# Patient Record
Sex: FEMALE | Race: BLACK OR AFRICAN AMERICAN | NOT HISPANIC OR LATINO | URBAN - METROPOLITAN AREA
[De-identification: names, ages, dates, MRNs, and addresses within clinical notes are randomized per-mention and may not be internally consistent; named-entity substitution may affect disease eponyms.]

---

## 2024-06-07 ENCOUNTER — HOSPITAL ENCOUNTER (OUTPATIENT)
Facility: HOSPITAL | Age: 29
Discharge: HOME | End: 2024-06-07
Attending: OBSTETRICS & GYNECOLOGY | Admitting: OBSTETRICS & GYNECOLOGY
Payer: MEDICAID

## 2024-06-07 VITALS
TEMPERATURE: 98.2 F | OXYGEN SATURATION: 98 % | DIASTOLIC BLOOD PRESSURE: 72 MMHG | BODY MASS INDEX: 26.13 KG/M2 | HEART RATE: 99 BPM | SYSTOLIC BLOOD PRESSURE: 127 MMHG | RESPIRATION RATE: 18 BRPM | HEIGHT: 70 IN | WEIGHT: 182.54 LBS

## 2024-06-07 PROBLEM — O99.342 ANXIETY DURING PREGNANCY IN SECOND TRIMESTER, ANTEPARTUM (HHS-HCC): Status: ACTIVE | Noted: 2024-04-04

## 2024-06-07 PROBLEM — O99.013 ANEMIA DURING PREGNANCY IN THIRD TRIMESTER (HHS-HCC): Status: ACTIVE | Noted: 2022-06-14

## 2024-06-07 PROBLEM — O30.033 MONOCHORIONIC DIAMNIOTIC TWIN GESTATION IN THIRD TRIMESTER (HHS-HCC): Status: ACTIVE | Noted: 2024-04-04

## 2024-06-07 PROBLEM — F41.9 ANXIETY DURING PREGNANCY IN SECOND TRIMESTER, ANTEPARTUM (HHS-HCC): Status: ACTIVE | Noted: 2024-04-04

## 2024-06-07 PROBLEM — O99.342 DEPRESSION AFFECTING PREGNANCY IN SECOND TRIMESTER, ANTEPARTUM (HHS-HCC): Status: ACTIVE | Noted: 2024-04-04

## 2024-06-07 PROBLEM — E88.819 INSULIN RESISTANCE: Status: ACTIVE | Noted: 2024-05-24

## 2024-06-07 PROBLEM — F32.A DEPRESSION AFFECTING PREGNANCY IN SECOND TRIMESTER, ANTEPARTUM (HHS-HCC): Status: ACTIVE | Noted: 2024-04-04

## 2024-06-07 PROBLEM — O40.3XX2: Status: ACTIVE | Noted: 2024-04-05

## 2024-06-07 PROBLEM — O09.32: Status: ACTIVE | Noted: 2024-04-04

## 2024-06-07 PROBLEM — O09.292: Status: ACTIVE | Noted: 2024-04-04

## 2024-06-07 LAB
BILIRUBIN, POC: NEGATIVE
BLOOD URINE, POC: NEGATIVE
CLARITY, POC: CLEAR
COLOR, POC: YELLOW
GLUCOSE URINE, POC: NEGATIVE
KETONES, POC: POSITIVE
LEUKOCYTE EST, POC: NEGATIVE
NITRITE, POC: NEGATIVE
PH, POC: 6
POC APPEARANCE OF BODY FLUID: CLEAR
SPECIFIC GRAVITY, POC: 1.01
URINE PROTEIN, POC: NEGATIVE
UROBILINOGEN, POC: 0.2

## 2024-06-07 PROCEDURE — 2500000004 HC RX 250 GENERAL PHARMACY W/ HCPCS (ALT 636 FOR OP/ED)

## 2024-06-07 PROCEDURE — 99213 OFFICE O/P EST LOW 20 MIN: CPT

## 2024-06-07 PROCEDURE — 96372 THER/PROPH/DIAG INJ SC/IM: CPT

## 2024-06-07 PROCEDURE — 99214 OFFICE O/P EST MOD 30 MIN: CPT | Mod: 25

## 2024-06-07 RX ORDER — FERROUS SULFATE 325(65) MG
325 TABLET ORAL 2 TIMES DAILY
COMMUNITY
Start: 2024-05-14 | End: 2024-06-13

## 2024-06-07 RX ORDER — LIDOCAINE HYDROCHLORIDE 10 MG/ML
0.5 INJECTION INFILTRATION; PERINEURAL ONCE AS NEEDED
Status: DISCONTINUED | OUTPATIENT
Start: 2024-06-07 | End: 2024-06-07 | Stop reason: HOSPADM

## 2024-06-07 RX ORDER — PRENATAL WITH FERROUS FUM AND FOLIC ACID 3080; 920; 120; 400; 22; 1.84; 3; 20; 10; 1; 12; 200; 27; 25; 2 [IU]/1; [IU]/1; MG/1; [IU]/1; MG/1; MG/1; MG/1; MG/1; MG/1; MG/1; UG/1; MG/1; MG/1; MG/1; MG/1
1 TABLET ORAL DAILY
COMMUNITY
Start: 2021-01-27 | End: 2024-06-11 | Stop reason: HOSPADM

## 2024-06-07 RX ORDER — BETAMETHASONE SODIUM PHOSPHATE AND BETAMETHASONE ACETATE 3; 3 MG/ML; MG/ML
12 INJECTION, SUSPENSION INTRA-ARTICULAR; INTRALESIONAL; INTRAMUSCULAR; SOFT TISSUE ONCE
Status: COMPLETED | OUTPATIENT
Start: 2024-06-07 | End: 2024-06-07

## 2024-06-07 RX ADMIN — BETAMETHASONE ACETATE AND BETAMETHASONE SODIUM PHOSPHATE 12 MG: 3; 3 INJECTION, SUSPENSION INTRA-ARTICULAR; INTRALESIONAL; INTRAMUSCULAR; SOFT TISSUE at 15:36

## 2024-06-07 SDOH — SOCIAL STABILITY: SOCIAL INSECURITY: ABUSE SCREEN: ADULT

## 2024-06-07 SDOH — HEALTH STABILITY: MENTAL HEALTH: HAVE YOU USED ANY PRESCRIPTION DRUGS OTHER THAN PRESCRIBED IN THE PAST 12 MONTHS?: NO

## 2024-06-07 SDOH — SOCIAL STABILITY: SOCIAL INSECURITY: PHYSICAL ABUSE: DENIES

## 2024-06-07 SDOH — SOCIAL STABILITY: SOCIAL INSECURITY: HAVE YOU HAD ANY THOUGHTS OF HARMING ANYONE ELSE?: NO

## 2024-06-07 SDOH — ECONOMIC STABILITY: HOUSING INSECURITY: DO YOU FEEL UNSAFE GOING BACK TO THE PLACE WHERE YOU ARE LIVING?: NO

## 2024-06-07 SDOH — SOCIAL STABILITY: SOCIAL INSECURITY: ARE THERE ANY APPARENT SIGNS OF INJURIES/BEHAVIORS THAT COULD BE RELATED TO ABUSE/NEGLECT?: NO

## 2024-06-07 SDOH — HEALTH STABILITY: MENTAL HEALTH: NON-SPECIFIC ACTIVE SUICIDAL THOUGHTS (PAST 1 MONTH): NO

## 2024-06-07 SDOH — HEALTH STABILITY: MENTAL HEALTH: STRENGTHS (MUST CHOOSE TWO): SUPPORT FROM FAMILY;SUPPORT FROM FRIENDS

## 2024-06-07 SDOH — SOCIAL STABILITY: SOCIAL INSECURITY: DO YOU FEEL ANYONE HAS EXPLOITED OR TAKEN ADVANTAGE OF YOU FINANCIALLY OR OF YOUR PERSONAL PROPERTY?: NO

## 2024-06-07 SDOH — SOCIAL STABILITY: SOCIAL INSECURITY: HAS ANYONE EVER THREATENED TO HURT YOUR FAMILY OR YOUR PETS?: NO

## 2024-06-07 SDOH — HEALTH STABILITY: MENTAL HEALTH: HAVE YOU USED ANY SUBSTANCES (CANABIS, COCAINE, HEROIN, HALLUCINOGENS, INHALANTS, ETC.) IN THE PAST 12 MONTHS?: NO

## 2024-06-07 SDOH — SOCIAL STABILITY: SOCIAL INSECURITY: VERBAL ABUSE: DENIES

## 2024-06-07 SDOH — HEALTH STABILITY: MENTAL HEALTH: SUICIDAL BEHAVIOR (LIFETIME): NO

## 2024-06-07 SDOH — SOCIAL STABILITY: SOCIAL INSECURITY: ARE YOU OR HAVE YOU BEEN THREATENED OR ABUSED PHYSICALLY, EMOTIONALLY, OR SEXUALLY BY ANYONE?: NO

## 2024-06-07 SDOH — SOCIAL STABILITY: SOCIAL INSECURITY: DOES ANYONE TRY TO KEEP YOU FROM HAVING/CONTACTING OTHER FRIENDS OR DOING THINGS OUTSIDE YOUR HOME?: NO

## 2024-06-07 SDOH — HEALTH STABILITY: MENTAL HEALTH: WERE YOU ABLE TO COMPLETE ALL THE BEHAVIORAL HEALTH SCREENINGS?: YES

## 2024-06-07 SDOH — SOCIAL STABILITY: SOCIAL INSECURITY: HAVE YOU HAD THOUGHTS OF HARMING ANYONE ELSE?: NO

## 2024-06-07 SDOH — HEALTH STABILITY: MENTAL HEALTH: WISH TO BE DEAD (PAST 1 MONTH): NO

## 2024-06-07 ASSESSMENT — LIFESTYLE VARIABLES
SKIP TO QUESTIONS 9-10: 1
AUDIT-C TOTAL SCORE: 0
HOW OFTEN DO YOU HAVE 6 OR MORE DRINKS ON ONE OCCASION: NEVER
AUDIT-C TOTAL SCORE: 0
HOW OFTEN DO YOU HAVE A DRINK CONTAINING ALCOHOL: NEVER
HOW MANY STANDARD DRINKS CONTAINING ALCOHOL DO YOU HAVE ON A TYPICAL DAY: PATIENT DOES NOT DRINK

## 2024-06-07 ASSESSMENT — PATIENT HEALTH QUESTIONNAIRE - PHQ9
2. FEELING DOWN, DEPRESSED OR HOPELESS: NOT AT ALL
SUM OF ALL RESPONSES TO PHQ9 QUESTIONS 1 & 2: 0
1. LITTLE INTEREST OR PLEASURE IN DOING THINGS: NOT AT ALL

## 2024-06-07 ASSESSMENT — PAIN SCALES - GENERAL: PAINLEVEL_OUTOF10: 0 - NO PAIN

## 2024-06-07 NOTE — H&P
Obstetrical Triage History and Physical     Jeni Davis is a 28 y.o.  at 34.6 wga with mo-di twins by LMP c/w 26.5 week US presenting to OB triage for IOL.    Chief Complaint: Requesting IOL    Assessment/Plan      Polyhydramnios Baby A  - Dx today with BSUS by Dr. Chavarria  - Counseled on BMZ, patient accepts  - First dose BMZ  153  - Patient to present to L&D  for BMZ #2 and IOL @ 0000     IUP at 34.6 mo-di twins  - NST reactive x2  - BPP 8/8 x2  - See Dr. Chavarria's note for ultrasonography and counseling  - Good fetal movement x2  - for GBS collection     Maternal Well-being  - All questions and concerns addressed   - Partner deploys Monday    Dispo  - patient appropriate for d/c home, agrees with plan  - f/u at next scheduled OB appt or to triage sooner as needed    Discussed plan and reviewed tracing with Dr. Deon Riggs, APRN-CNP      Active Problems:    Anemia during pregnancy in third trimester ([facility])    Anxiety during pregnancy in second trimester, antepartum ([facility])    Depression affecting pregnancy in second trimester, antepartum ([facility])    Monochorionic diamniotic twin gestation in third trimester ([facility])    Polyhydramnios in third trimester, fetus 2 ([facility])      Pregnancy Problems (from 24 to present)       No problems associated with this episode.          Subjective   Jeni is here wanting IOL. Good fetal movement x2. Denies vaginal bleeding., C/O of occasional contractions., Denies leaking of fluid.      Patient was scheduled at [place] [facility], was sent to [facility] due to census at [facility]. [facility] deferred IOL today given no steroid course started.      Obstetrical History   OB History    Para Term  AB Living   3 1 1   1 1   SAB IAB Ectopic Multiple Live Births     1     1      # Outcome Date GA Lbr Ben/2nd Weight Sex Delivery Anes PTL Lv   3 Current            2 Term 22    M Vag-Spont   DEE DEE   1 IAB                Past  Medical History  Past Medical History:   Diagnosis Date    Other specified health status     No known health problems        Past Surgical History   Past Surgical History:   Procedure Laterality Date    OTHER SURGICAL HISTORY  08/08/2016    Prior Surgical Procedure Not Done       Social History  Social History     Tobacco Use    Smoking status: Not on file    Smokeless tobacco: Not on file   Substance Use Topics    Alcohol use: Not on file     Substance and Sexual Activity   Drug Use Not on file       Allergies  Patient has no known allergies.     Medications  No medications prior to admission.       Objective    Last Vitals  Temp Pulse Resp BP MAP O2 Sat   36.8 °C (98.2 °F) 99 18 127/72   98 %     Physical Examination  FHR is 140 , with accelerations , and a category I  tracing x2  [place] reading:  irregular ctx, > 10 min apart  The fetus A and B are in a vertex presentation, determined by ultrasound  General: Examination reveals a well developed, well nourished, female, in no acute distress. She is alert and cooperative.  Lungs: symmetrical, non-labored breathing.  Cardiac: warm, well-perfused.  Abdomen: soft, non-tender, gravid.  Extremities: no redness or tenderness in the calves or thighs.  Neurological: alert, oriented, normal speech, no focal findings or movement disorder noted.     Non-Stress Test   Baseline Fetal Heart Rate for Non-Stress Test: 140 BPM  Variability in Waveform for Non-Stress Test: Moderate  Accelerations in Non-Stress Test: Yes, greater than/equal to 15 bpm, lasting at least 15 seconds  Decelerations in Non-Stress Test: None  Contractions in Non-Stress Test: Irregular  NST Contraction Frequency: > 10 min  Acoustic Stimulator for Non-Stress Test: No  Interpretation of Non-Stress Test   Interpretation of Non-Stress Test: Reactive  NST for Multiple Fetuses: Yes  Non-Stress Test B  Baseline Fetal Heart Rate for Non-Stress Test B: 140 BPM  Variability in Waveform for Non-Stress Test B:  Moderate  Accelerations in Non-Stress Test B: Yes, greater than/equal to 15 bpm, lasting at least 15 seconds  Decelerations in Non-Stress Test B: None  Contractions in Non-Stress Test B: Irregular  NST B Contraction Frequency: > 10 min  Acoustic Stimulator for Non-Stress Test B: No  Interpretation of Non-Stress Test B  Interpretation of Non-Stress Test B: Reactive                       Lab Review  Admission on 06/07/2024, Discharged on 06/07/2024   Component Date Value Ref Range Status    Color, UA 06/07/2024 Yellow   In process    Clarity, UA 06/07/2024 Clear   In process    Glucose, UA 06/07/2024 NEGATIVE   In process    Bilirubin, UA 06/07/2024 NEGATIVE   In process    Ketones, UA 06/07/2024 Positive   In process    Spec Grav, UA 06/07/2024 1.015   In process    Blood, UA 06/07/2024 Negative   In process    pH, UA 06/07/2024 6.0   In process    Protein, UA 06/07/2024 NEGATIVE   In process    Urobilinogen, UA 06/07/2024 0.2   In process    Leukocytes, UA 06/07/2024 NEGATIVE   In process    Nitrite, UA 06/07/2024 NEGATIVE   In process    Appearance, Fluid 06/07/2024 Clear   In process

## 2024-06-07 NOTE — SIGNIFICANT EVENT
Briefly, patient is 34w6d with MCDA twins complicated by polyhydramnios for one baby and a slight growth discrepency with EFWs 1689g and 2063g respectively. Twins are vertex/vertex.     She was scheduled originally for IOL at [facility] at 35w6d but this was moved up a week due to the polyhydramnios and her partners impending deployment on Monday. Steroids were NOT given for FLM.     She presented today to [facility] for induction and was turned away due to acuity on the floor and the lack of steroids. She presents here requesting IOL.     NST reactive x 2.     Bedside ultrasound by me: vertex/vertex, MVP's 4.1 and 9.8cm. BPP 8/8 x 2, normal UA and MCDA dopplers x 2.     Discussed with patient that IOL for MCDA twins is recommended by ACOG and [facility] at [id number] with my personal practice being delivery of all MCDA twins by 36 weeks.     Reviewed she has no overt medical indication for induction of labor today and that I have significant hesitation delivering  female fetuses at this gestational age without steroid administration.     Patient agrees to steroids today and tomorrow with IOL to start with 2nd dose of steroids given spouses impending deployment. She is aware of the risks of prematurity and high likelihood of a NICU stay for both twins. She declines a NICU consultation. She will return tomorrow for IOL.     Urszula Chavarria MD  Maternal Fetal Medicine  Director of Fetal Intervention

## 2024-06-08 ENCOUNTER — ANESTHESIA EVENT (OUTPATIENT)
Dept: OBSTETRICS AND GYNECOLOGY | Facility: HOSPITAL | Age: 29
End: 2024-06-08
Payer: MEDICAID

## 2024-06-08 ENCOUNTER — ANESTHESIA (OUTPATIENT)
Dept: OBSTETRICS AND GYNECOLOGY | Facility: HOSPITAL | Age: 29
End: 2024-06-08
Payer: MEDICAID

## 2024-06-08 ENCOUNTER — HOSPITAL ENCOUNTER (INPATIENT)
Facility: HOSPITAL | Age: 29
LOS: 3 days | Discharge: HOME | End: 2024-06-11
Attending: OBSTETRICS & GYNECOLOGY | Admitting: STUDENT IN AN ORGANIZED HEALTH CARE EDUCATION/TRAINING PROGRAM
Payer: MEDICAID

## 2024-06-08 PROBLEM — Z34.90 ENCOUNTER FOR ELECTIVE INDUCTION OF LABOR (HHS-HCC): Status: ACTIVE | Noted: 2024-06-08

## 2024-06-08 PROBLEM — K21.9 GASTROESOPHAGEAL REFLUX DISEASE: Status: ACTIVE | Noted: 2024-06-08

## 2024-06-08 PROBLEM — F41.9 ANXIETY: Status: ACTIVE | Noted: 2024-06-08

## 2024-06-08 PROBLEM — F43.10 PTSD (POST-TRAUMATIC STRESS DISORDER): Status: ACTIVE | Noted: 2024-06-08

## 2024-06-08 LAB
ABO GROUP (TYPE) IN BLOOD: NORMAL
ABO GROUP (TYPE) IN BLOOD: NORMAL
ANTIBODY SCREEN: NORMAL
ERYTHROCYTE [DISTWIDTH] IN BLOOD BY AUTOMATED COUNT: 19.5 % (ref 11.5–14.5)
GLUCOSE BLD MANUAL STRIP-MCNC: 118 MG/DL (ref 74–99)
GLUCOSE BLD MANUAL STRIP-MCNC: 125 MG/DL (ref 74–99)
HCT VFR BLD AUTO: 31.6 % (ref 36–46)
HGB BLD-MCNC: 9.5 G/DL (ref 12–16)
MCH RBC QN AUTO: 23.6 PG (ref 26–34)
MCHC RBC AUTO-ENTMCNC: 30.1 G/DL (ref 32–36)
MCV RBC AUTO: 78 FL (ref 80–100)
NRBC BLD-RTO: 0.6 /100 WBCS (ref 0–0)
PLATELET # BLD AUTO: 204 X10*3/UL (ref 150–450)
RBC # BLD AUTO: 4.03 X10*6/UL (ref 4–5.2)
RH FACTOR (ANTIGEN D): NORMAL
RH FACTOR (ANTIGEN D): NORMAL
WBC # BLD AUTO: 7.8 X10*3/UL (ref 4.4–11.3)

## 2024-06-08 PROCEDURE — 4A1H7CZ MONITORING OF PRODUCTS OF CONCEPTION, CARDIAC RATE, VIA NATURAL OR ARTIFICIAL OPENING: ICD-10-PCS | Performed by: STUDENT IN AN ORGANIZED HEALTH CARE EDUCATION/TRAINING PROGRAM

## 2024-06-08 PROCEDURE — 2500000004 HC RX 250 GENERAL PHARMACY W/ HCPCS (ALT 636 FOR OP/ED): Performed by: STUDENT IN AN ORGANIZED HEALTH CARE EDUCATION/TRAINING PROGRAM

## 2024-06-08 PROCEDURE — 1120000001 HC OB PRIVATE ROOM DAILY

## 2024-06-08 PROCEDURE — 59050 FETAL MONITOR W/REPORT: CPT

## 2024-06-08 PROCEDURE — 10907ZC DRAINAGE OF AMNIOTIC FLUID, THERAPEUTIC FROM PRODUCTS OF CONCEPTION, VIA NATURAL OR ARTIFICIAL OPENING: ICD-10-PCS | Performed by: STUDENT IN AN ORGANIZED HEALTH CARE EDUCATION/TRAINING PROGRAM

## 2024-06-08 PROCEDURE — 36415 COLL VENOUS BLD VENIPUNCTURE: CPT | Performed by: STUDENT IN AN ORGANIZED HEALTH CARE EDUCATION/TRAINING PROGRAM

## 2024-06-08 PROCEDURE — 2500000004 HC RX 250 GENERAL PHARMACY W/ HCPCS (ALT 636 FOR OP/ED)

## 2024-06-08 PROCEDURE — 7210000002 HC LABOR PER HOUR

## 2024-06-08 PROCEDURE — 2500000005 HC RX 250 GENERAL PHARMACY W/O HCPCS

## 2024-06-08 PROCEDURE — 3700000001 HC GENERAL ANESTHESIA TIME - INITIAL BASE CHARGE: Performed by: STUDENT IN AN ORGANIZED HEALTH CARE EDUCATION/TRAINING PROGRAM

## 2024-06-08 PROCEDURE — 85027 COMPLETE CBC AUTOMATED: CPT | Performed by: STUDENT IN AN ORGANIZED HEALTH CARE EDUCATION/TRAINING PROGRAM

## 2024-06-08 PROCEDURE — 2500000005 HC RX 250 GENERAL PHARMACY W/O HCPCS: Performed by: STUDENT IN AN ORGANIZED HEALTH CARE EDUCATION/TRAINING PROGRAM

## 2024-06-08 PROCEDURE — 86923 COMPATIBILITY TEST ELECTRIC: CPT

## 2024-06-08 PROCEDURE — 82947 ASSAY GLUCOSE BLOOD QUANT: CPT

## 2024-06-08 PROCEDURE — 86901 BLOOD TYPING SEROLOGIC RH(D): CPT | Performed by: STUDENT IN AN ORGANIZED HEALTH CARE EDUCATION/TRAINING PROGRAM

## 2024-06-08 PROCEDURE — 3700000014 EPIDURAL BLOCK: Mod: GC

## 2024-06-08 PROCEDURE — 3700000002 HC GENERAL ANESTHESIA TIME - EACH INCREMENTAL 1 MINUTE: Performed by: STUDENT IN AN ORGANIZED HEALTH CARE EDUCATION/TRAINING PROGRAM

## 2024-06-08 PROCEDURE — 86780 TREPONEMA PALLIDUM: CPT | Performed by: STUDENT IN AN ORGANIZED HEALTH CARE EDUCATION/TRAINING PROGRAM

## 2024-06-08 RX ORDER — INSULIN LISPRO 100 [IU]/ML
0-5 INJECTION, SOLUTION INTRAVENOUS; SUBCUTANEOUS
Status: DISCONTINUED | OUTPATIENT
Start: 2024-06-08 | End: 2024-06-09

## 2024-06-08 RX ORDER — FENTANYL CITRATE 50 UG/ML
50 INJECTION, SOLUTION INTRAMUSCULAR; INTRAVENOUS ONCE
Status: COMPLETED | OUTPATIENT
Start: 2024-06-08 | End: 2024-06-08

## 2024-06-08 RX ORDER — CARBOPROST TROMETHAMINE 250 UG/ML
250 INJECTION, SOLUTION INTRAMUSCULAR ONCE AS NEEDED
Status: DISCONTINUED | OUTPATIENT
Start: 2024-06-08 | End: 2024-06-09

## 2024-06-08 RX ORDER — METOCLOPRAMIDE 10 MG/1
10 TABLET ORAL EVERY 6 HOURS PRN
Status: DISCONTINUED | OUTPATIENT
Start: 2024-06-08 | End: 2024-06-09

## 2024-06-08 RX ORDER — TRANEXAMIC ACID 100 MG/ML
1000 INJECTION, SOLUTION INTRAVENOUS ONCE AS NEEDED
Status: DISCONTINUED | OUTPATIENT
Start: 2024-06-08 | End: 2024-06-09

## 2024-06-08 RX ORDER — LOPERAMIDE HYDROCHLORIDE 2 MG/1
4 CAPSULE ORAL EVERY 2 HOUR PRN
Status: DISCONTINUED | OUTPATIENT
Start: 2024-06-08 | End: 2024-06-09

## 2024-06-08 RX ORDER — OXYTOCIN 10 [USP'U]/ML
10 INJECTION, SOLUTION INTRAMUSCULAR; INTRAVENOUS ONCE AS NEEDED
Status: DISCONTINUED | OUTPATIENT
Start: 2024-06-08 | End: 2024-06-09

## 2024-06-08 RX ORDER — METHYLERGONOVINE MALEATE 0.2 MG/ML
0.2 INJECTION INTRAVENOUS ONCE AS NEEDED
Status: DISCONTINUED | OUTPATIENT
Start: 2024-06-08 | End: 2024-06-09

## 2024-06-08 RX ORDER — ONDANSETRON 4 MG/1
4 TABLET, FILM COATED ORAL EVERY 6 HOURS PRN
Status: DISCONTINUED | OUTPATIENT
Start: 2024-06-08 | End: 2024-06-09

## 2024-06-08 RX ORDER — BETAMETHASONE SODIUM PHOSPHATE AND BETAMETHASONE ACETATE 3; 3 MG/ML; MG/ML
12 INJECTION, SUSPENSION INTRA-ARTICULAR; INTRALESIONAL; INTRAMUSCULAR; SOFT TISSUE ONCE
Status: COMPLETED | OUTPATIENT
Start: 2024-06-08 | End: 2024-06-08

## 2024-06-08 RX ORDER — NIFEDIPINE 10 MG/1
10 CAPSULE ORAL ONCE AS NEEDED
Status: DISCONTINUED | OUTPATIENT
Start: 2024-06-08 | End: 2024-06-09

## 2024-06-08 RX ORDER — DEXTROSE 50 % IN WATER (D50W) INTRAVENOUS SYRINGE
25
Status: DISCONTINUED | OUTPATIENT
Start: 2024-06-08 | End: 2024-06-09

## 2024-06-08 RX ORDER — LIDOCAINE HYDROCHLORIDE 10 MG/ML
30 INJECTION INFILTRATION; PERINEURAL ONCE AS NEEDED
Status: DISCONTINUED | OUTPATIENT
Start: 2024-06-08 | End: 2024-06-09

## 2024-06-08 RX ORDER — ONDANSETRON HYDROCHLORIDE 2 MG/ML
4 INJECTION, SOLUTION INTRAVENOUS EVERY 6 HOURS PRN
Status: DISCONTINUED | OUTPATIENT
Start: 2024-06-08 | End: 2024-06-09

## 2024-06-08 RX ORDER — SODIUM CHLORIDE, SODIUM LACTATE, POTASSIUM CHLORIDE, CALCIUM CHLORIDE 600; 310; 30; 20 MG/100ML; MG/100ML; MG/100ML; MG/100ML
125 INJECTION, SOLUTION INTRAVENOUS CONTINUOUS
Status: DISCONTINUED | OUTPATIENT
Start: 2024-06-08 | End: 2024-06-09

## 2024-06-08 RX ORDER — HYDRALAZINE HYDROCHLORIDE 20 MG/ML
5 INJECTION INTRAMUSCULAR; INTRAVENOUS ONCE AS NEEDED
Status: DISCONTINUED | OUTPATIENT
Start: 2024-06-08 | End: 2024-06-09

## 2024-06-08 RX ORDER — METOCLOPRAMIDE HYDROCHLORIDE 5 MG/ML
10 INJECTION INTRAMUSCULAR; INTRAVENOUS EVERY 6 HOURS PRN
Status: DISCONTINUED | OUTPATIENT
Start: 2024-06-08 | End: 2024-06-09

## 2024-06-08 RX ORDER — OXYTOCIN/0.9 % SODIUM CHLORIDE 30/500 ML
2-30 PLASTIC BAG, INJECTION (ML) INTRAVENOUS CONTINUOUS
Status: DISCONTINUED | OUTPATIENT
Start: 2024-06-08 | End: 2024-06-09

## 2024-06-08 RX ORDER — DEXTROSE 50 % IN WATER (D50W) INTRAVENOUS SYRINGE
12.5
Status: DISCONTINUED | OUTPATIENT
Start: 2024-06-08 | End: 2024-06-09

## 2024-06-08 RX ORDER — ACETAMINOPHEN 325 MG/1
975 TABLET ORAL EVERY 6 HOURS PRN
Status: DISCONTINUED | OUTPATIENT
Start: 2024-06-08 | End: 2024-06-09

## 2024-06-08 RX ORDER — OXYTOCIN/0.9 % SODIUM CHLORIDE 30/500 ML
60 PLASTIC BAG, INJECTION (ML) INTRAVENOUS ONCE AS NEEDED
Status: DISCONTINUED | OUTPATIENT
Start: 2024-06-08 | End: 2024-06-09

## 2024-06-08 RX ORDER — FENTANYL/BUPIVACAINE/NS/PF 2MCG/ML-.1
PLASTIC BAG, INJECTION (ML) INJECTION AS NEEDED
Status: DISCONTINUED | OUTPATIENT
Start: 2024-06-08 | End: 2024-06-09

## 2024-06-08 RX ORDER — GUAIFENESIN 600 MG/1
600 TABLET, EXTENDED RELEASE ORAL 2 TIMES DAILY PRN
Status: DISCONTINUED | OUTPATIENT
Start: 2024-06-08 | End: 2024-06-09

## 2024-06-08 RX ORDER — TERBUTALINE SULFATE 1 MG/ML
0.25 INJECTION SUBCUTANEOUS ONCE AS NEEDED
Status: DISCONTINUED | OUTPATIENT
Start: 2024-06-08 | End: 2024-06-09

## 2024-06-08 RX ORDER — LABETALOL HYDROCHLORIDE 5 MG/ML
20 INJECTION, SOLUTION INTRAVENOUS ONCE AS NEEDED
Status: DISCONTINUED | OUTPATIENT
Start: 2024-06-08 | End: 2024-06-09

## 2024-06-08 RX ORDER — MISOPROSTOL 200 UG/1
800 TABLET ORAL ONCE AS NEEDED
Status: COMPLETED | OUTPATIENT
Start: 2024-06-08 | End: 2024-06-09

## 2024-06-08 RX ORDER — PENICILLIN G 3000000 [IU]/50ML
3 INJECTION, SOLUTION INTRAVENOUS EVERY 4 HOURS
Status: DISCONTINUED | OUTPATIENT
Start: 2024-06-08 | End: 2024-06-09

## 2024-06-08 RX ADMIN — GUAIFENESIN 600 MG: 600 TABLET ORAL at 16:44

## 2024-06-08 RX ADMIN — EPINEPHRINE 14 ML/HR: 1 INJECTION, SOLUTION, CONCENTRATE INTRAVENOUS at 21:33

## 2024-06-08 RX ADMIN — BETAMETHASONE ACETATE AND BETAMETHASONE SODIUM PHOSPHATE 12 MG: 3; 3 INJECTION, SUSPENSION INTRA-ARTICULAR; INTRALESIONAL; INTRAMUSCULAR; SOFT TISSUE at 16:44

## 2024-06-08 RX ADMIN — SODIUM CHLORIDE, POTASSIUM CHLORIDE, SODIUM LACTATE AND CALCIUM CHLORIDE 125 ML/HR: 600; 310; 30; 20 INJECTION, SOLUTION INTRAVENOUS at 15:51

## 2024-06-08 RX ADMIN — FENTANYL CITRATE 5 ML: 50 INJECTION INTRAMUSCULAR; INTRAVENOUS at 21:31

## 2024-06-08 RX ADMIN — ONDANSETRON 4 MG: 2 INJECTION INTRAMUSCULAR; INTRAVENOUS at 16:53

## 2024-06-08 RX ADMIN — Medication: at 19:40

## 2024-06-08 RX ADMIN — PENICILLIN G 3 MILLION UNITS: 3000000 INJECTION, SOLUTION INTRAVENOUS at 20:15

## 2024-06-08 RX ADMIN — SODIUM CHLORIDE, POTASSIUM CHLORIDE, SODIUM LACTATE AND CALCIUM CHLORIDE 500 ML: 600; 310; 30; 20 INJECTION, SOLUTION INTRAVENOUS at 21:00

## 2024-06-08 RX ADMIN — PENICILLIN G POTASSIUM 5 MILLION UNITS: 5000000 INJECTION, POWDER, FOR SOLUTION INTRAMUSCULAR; INTRAVENOUS at 16:23

## 2024-06-08 RX ADMIN — FENTANYL CITRATE 50 MCG: 50 INJECTION, SOLUTION INTRAMUSCULAR; INTRAVENOUS at 16:58

## 2024-06-08 RX ADMIN — ACETAMINOPHEN 975 MG: 325 TABLET ORAL at 16:44

## 2024-06-08 RX ADMIN — FENTANYL CITRATE 5 ML: 50 INJECTION INTRAMUSCULAR; INTRAVENOUS at 21:28

## 2024-06-08 RX ADMIN — Medication 2 MILLI-UNITS/MIN: at 22:30

## 2024-06-08 SDOH — HEALTH STABILITY: MENTAL HEALTH: NON-SPECIFIC ACTIVE SUICIDAL THOUGHTS (PAST 1 MONTH): NO

## 2024-06-08 SDOH — SOCIAL STABILITY: SOCIAL INSECURITY: DOES ANYONE TRY TO KEEP YOU FROM HAVING/CONTACTING OTHER FRIENDS OR DOING THINGS OUTSIDE YOUR HOME?: NO

## 2024-06-08 SDOH — HEALTH STABILITY: MENTAL HEALTH: STRENGTHS (MUST CHOOSE TWO): SUPPORT FROM FRIENDS;SUPPORT FROM FAMILY

## 2024-06-08 SDOH — SOCIAL STABILITY: SOCIAL INSECURITY: HAS ANYONE EVER THREATENED TO HURT YOUR FAMILY OR YOUR PETS?: NO

## 2024-06-08 SDOH — SOCIAL STABILITY: SOCIAL INSECURITY: ABUSE SCREEN: ADULT

## 2024-06-08 SDOH — SOCIAL STABILITY: SOCIAL INSECURITY: ARE YOU OR HAVE YOU BEEN THREATENED OR ABUSED PHYSICALLY, EMOTIONALLY, OR SEXUALLY BY ANYONE?: NO

## 2024-06-08 SDOH — SOCIAL STABILITY: SOCIAL INSECURITY: HAVE YOU HAD THOUGHTS OF HARMING ANYONE ELSE?: NO

## 2024-06-08 SDOH — SOCIAL STABILITY: SOCIAL INSECURITY: VERBAL ABUSE: DENIES

## 2024-06-08 SDOH — HEALTH STABILITY: MENTAL HEALTH: WISH TO BE DEAD (PAST 1 MONTH): NO

## 2024-06-08 SDOH — HEALTH STABILITY: MENTAL HEALTH: SUICIDAL BEHAVIOR (LIFETIME): NO

## 2024-06-08 SDOH — SOCIAL STABILITY: SOCIAL INSECURITY: HAVE YOU HAD ANY THOUGHTS OF HARMING ANYONE ELSE?: NO

## 2024-06-08 SDOH — ECONOMIC STABILITY: HOUSING INSECURITY: DO YOU FEEL UNSAFE GOING BACK TO THE PLACE WHERE YOU ARE LIVING?: NO

## 2024-06-08 SDOH — HEALTH STABILITY: MENTAL HEALTH: HAVE YOU USED ANY PRESCRIPTION DRUGS OTHER THAN PRESCRIBED IN THE PAST 12 MONTHS?: NO

## 2024-06-08 SDOH — SOCIAL STABILITY: SOCIAL INSECURITY: ARE THERE ANY APPARENT SIGNS OF INJURIES/BEHAVIORS THAT COULD BE RELATED TO ABUSE/NEGLECT?: NO

## 2024-06-08 SDOH — SOCIAL STABILITY: SOCIAL INSECURITY: PHYSICAL ABUSE: DENIES

## 2024-06-08 SDOH — HEALTH STABILITY: MENTAL HEALTH: HAVE YOU USED ANY SUBSTANCES (CANABIS, COCAINE, HEROIN, HALLUCINOGENS, INHALANTS, ETC.) IN THE PAST 12 MONTHS?: NO

## 2024-06-08 SDOH — SOCIAL STABILITY: SOCIAL INSECURITY: DO YOU FEEL ANYONE HAS EXPLOITED OR TAKEN ADVANTAGE OF YOU FINANCIALLY OR OF YOUR PERSONAL PROPERTY?: NO

## 2024-06-08 SDOH — HEALTH STABILITY: MENTAL HEALTH: WERE YOU ABLE TO COMPLETE ALL THE BEHAVIORAL HEALTH SCREENINGS?: YES

## 2024-06-08 ASSESSMENT — LIFESTYLE VARIABLES
HOW OFTEN DO YOU HAVE A DRINK CONTAINING ALCOHOL: NEVER
HOW OFTEN DO YOU HAVE 6 OR MORE DRINKS ON ONE OCCASION: NEVER
AUDIT-C TOTAL SCORE: 0
SKIP TO QUESTIONS 9-10: 1
HOW MANY STANDARD DRINKS CONTAINING ALCOHOL DO YOU HAVE ON A TYPICAL DAY: PATIENT DOES NOT DRINK
AUDIT-C TOTAL SCORE: 0

## 2024-06-08 ASSESSMENT — PAIN SCALES - GENERAL
PAINLEVEL_OUTOF10: 0 - NO PAIN
PAINLEVEL_OUTOF10: 5 - MODERATE PAIN
PAINLEVEL_OUTOF10: 0 - NO PAIN

## 2024-06-08 ASSESSMENT — ACTIVITIES OF DAILY LIVING (ADL): LACK_OF_TRANSPORTATION: NO

## 2024-06-08 NOTE — PROGRESS NOTES
Intrapartum Progress Note    Assessment/Plan   Elizabeth Davis is a 28 y.o.  at 35w0d. IRAIDA: 2024, undergoing IOL for mo/di twins    IOL  - currently with crb + pitocin, plan for AROM when able  - epidural by maternal request  - CEFM  - patient has been counseled by multiple providers regarding breech extraction of twin B, she does NOT desire this mode of delivery and would want a  if twin B were to be malpresenting following delivery of twin A    Mo/di @35.0wga IUP  - BMZ -   - GBS positive, PCN ppx   - Please see MFM note by Dr. Chavarria regarding decision for delivery prior to being fully steroid complete   - No GDM screening, baby B with polyhydramnios, last hgb A1c 5.9%. Continue q4hr glucose checks with SSI   - EFW 34%/63%, 12% discordance     Postpartum  - declines PPBC    Discussed with and seen by Dr Marco Antonio Garcia MD     Principal Problem:    Encounter for elective induction of labor ([facility])  Active Problems:    Anxiety    Gastroesophageal reflux disease      Subjective   Patient feeling cramping now with crb in place.     Objective   Last Vitals:  Temp Pulse Resp BP MAP Pulse Ox   36.4 °C (97.5 °F) 92 18 113/56   98 %     Vitals Min/Max Last 24 Hours:  Temp  Min: 36.4 °C (97.5 °F)  Max: 36.9 °C (98.4 °F)  Pulse  Min: 91  Max: 100  Resp  Min: 16  Max: 18  BP  Min: 113/56  Max: 119/78    Intake/Output:  No intake or output data in the 24 hours ending 24    Physical Examination:  GENERAL: Examination reveals a well developed, well nourished, gravid female in no acute distress. She is alert and cooperative.  LUNGS:  normal effort of breathing ORA  HEART:  warm and well perfused  FHR A  140, moderate variability, with Accelerations, and a Category I tracing.    FHR B 135, moderate variability, with Accelerations, and a Category I tracing.    [place] reading:   irritable  EXTREMITIES: no redness or tenderness in the calves or thighs, no edema  SKIN: normal coloration  "and turgor, no rashes  NEUROLOGICAL: alert, oriented, normal speech, no focal findings or movement disorder noted  PSYCHOLOGICAL: awake and alert; oriented to person, place, and time    Lab Review:  Lab Results   Component Value Date    ABO A 06/08/2024    LABRH POS 06/08/2024    ABSCRN NEG 06/08/2024     Lab Results   Component Value Date    WBC 7.8 06/08/2024    HGB 9.5 (L) 06/08/2024    HCT 31.6 (L) 06/08/2024     06/08/2024     No results found for: \"GRPBSTREP\"  "

## 2024-06-08 NOTE — ANESTHESIA PREPROCEDURE EVALUATION
Patient: Elizabeth Davis    Evaluation Method: In-person visit    Procedure Information    Date: 06/08/24  Procedure: Labor Consult         Relevant Problems   Anesthesia (within normal limits)      Pulmonary (within normal limits)      Neuro   (+) Anxiety   (+) Depression affecting pregnancy in second trimester, antepartum ([facility])      GI   (+) Gastroesophageal reflux disease (GERD with pregnancy)      /Renal (within normal limits)      Liver (within normal limits)      Endocrine (within normal limits)      Hematology   (+) Anemia during pregnancy in third trimester ([facility])      HEENT   (+) Conductive hearing loss      GYN   (+) Monochorionic diamniotic twin gestation in third trimester ([facility])       Clinical information reviewed:   Tobacco  Allergies  Meds   Med Hx  Surg Hx   Fam Hx  Soc Hx        NPO Detail:  No data recorded     OB/GYN     PHYSICAL EXAM    Anesthesia Plan

## 2024-06-08 NOTE — H&P
Obstetrical Admission History and Physical    Assessment/Plan    Elizabeth Davis is a 28 y.o.  at 35w0d, IRA[place]: 2024, by LMP c/w 25 wk US admitted for IOL in the setting of mo/di twins    IOL, mo/di twins  Admitted, consented, scanned, cephalic/cephalic  Will induce with CRB and pitocin, CRB placed   Epidural as requested. Recommended early epidural in setting of twin delivery.  CEFM, currently Cat I x2  Delivery plan: patient desires vaginal delivery, patient counseled on risks of labor, vaginal delivery, and possibility of C/S for varying maternal or fetal indications. Presentation currently cephalic x2. Discussed that there is a risk that baby B flips to breech presentation. Discussed that she would be a candidate for a breech extraction if B flips to breech given that they are concordantly grown and reviewed risk of head entrapment. Discussed that outcomes are similar for fetuses born by breech extraction and CS. Reviewed benefit of breech extraction in avoiding major surgery. At this time, patient expressed desire to avoid breech extraction and prefers primary CS in setting of breech extraction.    Fetal wellbeing  Mo/di twins at 35.0wga  BMZ 6/7-6/8 (second dose given at 1600)  GBS positive, PCN ppx  Please see MFM note by Dr. Chavarria regarding decision for delivery prior to being fully steroid complete  No GDM screening, baby B with polyhydramnios, last hgb A1c 5.9%. Will order q4hr glucose checks with SSI  EFW 34%/63%, 12% discordance    Postpartum planning:  Contraception: declines    Patient seen and discussed with Dr. Nadia Goodwin MD  OBGYN    Subjective   Patient presents for planned IOL in the setting of mo/di twins and for BMZ#2. Feeling some intermittent contractions. Denies LOF, VB. Reports good FM.    Pregnancy notable for:  Medical Problems       Problem List       * (Principal) Encounter for elective induction of labor ([facility])    Anemia during pregnancy in third trimester  ([facility])    Overview Signed 2024  7:35 PM by ROMY Crespo     Formatting of this note might be different from the original.   Hgb 8.3 on     Patient unable to tolerate oral iron.    Plan for IV iron infusions. Blood management referral placed .    Plan to type&cross during delivery admission given twin gestation.      Last Assessment & Plan:    Formatting of this note might be different from the original.   Iron and ferritin orders placed at last visit. Encouraged patient to go to lab.         Anxiety during pregnancy in second trimester, antepartum ([facility])    Conductive hearing loss    Depression affecting pregnancy in second trimester, antepartum ([facility])    History of maternal third degree perineal laceration, currently pregnant in second trimester ([facility])    Insulin resistance    Overview Signed 2024  7:35 PM by ROMY Crespo     Formatting of this note might be different from the original.   Third trimester screening A1c 5.9%   Patient did not  supplies.        Late prenatal care affecting pregnancy, antepartum, second trimester ([facility])    Monochorionic diamniotic twin gestation in third trimester ([facility])    Overview Signed 2024  7:35 PM by ROMY Crespo     Formatting of this note might be different from the original.   Mono-Di Twins confirmed on anatomy US    Twin B - mild polyhydramnios      [ x] TTTS screening q2wks    [ x] Growth [id number]    [ x] weekly BPPs starting 32 weeks    [ x] IOL vs CS pending fetal presentation         Polyhydramnios in third trimester, fetus 2 ([facility])    Overview Signed 2024  7:35 PM by JUDY Crespo-CNP     Formatting of this note might be different from the original.   Polyhyramnios of twin B - MVP 9.5 on     Hgb A1c 5.9% on .    Patient has not done 1hr GTT. Prefers monitoring blood sugars. Supplies sent .                Obstetrical History   OB History    Para  Term  AB Living   3 1 1   1 1   SAB IAB Ectopic Multiple Live Births     1     1      # Outcome Date GA Lbr Ben/2nd Weight Sex Delivery Anes PTL Lv   3 Current            2 Term 22    M Vag-Spont   DEE DEE   1 IAB                Past Medical History  Past Medical History:   Diagnosis Date    Other specified health status     No known health problems        Past Surgical History   Past Surgical History:   Procedure Laterality Date    OTHER SURGICAL HISTORY  2016    Prior Surgical Procedure Not Done       Social History  Social History     Tobacco Use    Smoking status: Never    Smokeless tobacco: Never   Substance Use Topics    Alcohol use: Never     Substance and Sexual Activity   Drug Use Never       Allergies  Patient has no known allergies.     Medications  Medications Prior to Admission   Medication Sig Dispense Refill Last Dose    ferrous sulfate, 325 mg ferrous sulfate, tablet Take 1 tablet by mouth twice a day. Patient stated she took this med today   2024    prenatal vitamin calcium-iron-folic (Prenatal Vitamin Plus Low Iron) 27 mg iron- 1 mg tablet Take 1 tablet by mouth once daily.   2024       Objective    Last Vitals  Temp Pulse Resp BP MAP O2 Sat   36.8 °C (98.2 °F) 91 16 119/78   98 %     Physical Examination  General: no acute distress  HEENT: normocephalic, atraumatic  Heart: warm and well perfused  Lungs: breathing comfortably on room air  Abdomen: gravid  Extremities: moving all extremities  Neuro: awake and conversant  Psych: appropriate mood and affect    OB exam:  SVE: /3, soft, anterior.   Patient was placed in dorsal lithotomy position, a speculum was placed, and a cervical ripening balloon was guided through the external and internal cervical os with a ring forceps. The balloon was inflated with 50cc of normal saline.  FHT:   A: 115/mod/+accel/-decel  B: 125/mod/+accel/-decel  [place]: q1-2min  BSUS: Diamniotic pregnancy, cephalic/cephalic presentation  A: 2412g  (34%), AC 42%  B: 2816g (63%), AC 90%, 14% discordance    Lab Review  Labs in chart have been reviewed.    OB Attending Addendum:     I saw and evaluated the patient. I personally obtained the key and critical portions of the history and physical exam or was physically present for key and critical portions performed by the resident/fellow. I reviewed the resident/fellow's documentation and discussed the patient with the resident/fellow. I agree with the resident/fellow's medical decision making as documented in the note.    Patient is a 28 y.o.  at 35w0d with mo-di twin pregnancy admitted for induction of labor.     - [place] balloon inserted, for pitocin for induction  - BMZ #2 given today at 1600  - Cephalic/cephalic, patient counseled as above, declines attempt at breech extraction if indicated and would opt for  for twin B  - FWB reassuring x2, cat 1 for both twins, concordant growth  - Polyhydramnios in twin B but both bladders visualized, no evidence of TTTS    Dispo: Continue induction of labor    Chiqui Zuniga MD

## 2024-06-08 NOTE — CARE PLAN
Problem: Antepartum  Goal: Maintain pregnancy as long as maternal and/or fetal condition is stable  Outcome: Met  Goal: Avoid/minimize constipation  Outcome: Met  Goal: No decrease in circulation/VTE  Outcome: Met  Goal: FHR remains reassuring  Outcome: Met  Goal: Minimize anxiety/maximize coping  Outcome: Met     Problem: Vaginal Birth or  Section  Goal: Fetal and maternal status remain reassuring during the birth process  Outcome: Met  Goal: Tolerate CRB for IOL placement maintenance until dislodgement/removal 12hrs after placement  Outcome: Met  Goal: Prevention of malpresentation/labor dystocia through delivery  Outcome: Progressing  Goal: Demonstrates labor coping techniques through delivery  Outcome: Progressing  Goal: Minimal s/sx of HDP and BP<160/110  Outcome: Progressing  Goal: No s/sx of infection through recovery  Outcome: Progressing  Goal: No s/sx of hemorrhage through recovery  Outcome: Progressing     Problem: Pain - Adult  Goal: Verbalizes/displays adequate comfort level or baseline comfort level  Outcome: Progressing     Problem: Safety - Adult  Goal: Free from fall injury  Outcome: Met   The patient's goals for the shift include pain free    The clinical goals for the shift include Reassuring FHR for both babies and reassuring uterine activity.

## 2024-06-09 LAB
GLUCOSE BLD MANUAL STRIP-MCNC: 98 MG/DL (ref 74–99)
TREPONEMA PALLIDUM IGG+IGM AB [PRESENCE] IN SERUM OR PLASMA BY IMMUNOASSAY: NONREACTIVE

## 2024-06-09 PROCEDURE — 2500000004 HC RX 250 GENERAL PHARMACY W/ HCPCS (ALT 636 FOR OP/ED)

## 2024-06-09 PROCEDURE — 7100000016 HC LABOR RECOVERY PER HOUR

## 2024-06-09 PROCEDURE — 1210000001 HC SEMI-PRIVATE ROOM DAILY

## 2024-06-09 PROCEDURE — 51701 INSERT BLADDER CATHETER: CPT

## 2024-06-09 PROCEDURE — 1220000001 HC OB SEMI-PRIVATE ROOM DAILY

## 2024-06-09 PROCEDURE — 59409 OBSTETRICAL CARE: CPT | Performed by: OBSTETRICS & GYNECOLOGY

## 2024-06-09 PROCEDURE — 82947 ASSAY GLUCOSE BLOOD QUANT: CPT

## 2024-06-09 PROCEDURE — 2500000004 HC RX 250 GENERAL PHARMACY W/ HCPCS (ALT 636 FOR OP/ED): Performed by: STUDENT IN AN ORGANIZED HEALTH CARE EDUCATION/TRAINING PROGRAM

## 2024-06-09 PROCEDURE — 59409 OBSTETRICAL CARE: CPT | Performed by: STUDENT IN AN ORGANIZED HEALTH CARE EDUCATION/TRAINING PROGRAM

## 2024-06-09 PROCEDURE — 7210000002 HC LABOR PER HOUR

## 2024-06-09 PROCEDURE — 88307 TISSUE EXAM BY PATHOLOGIST: CPT | Mod: TC,SUR | Performed by: STUDENT IN AN ORGANIZED HEALTH CARE EDUCATION/TRAINING PROGRAM

## 2024-06-09 PROCEDURE — 2500000002 HC RX 250 W HCPCS SELF ADMINISTERED DRUGS (ALT 637 FOR MEDICARE OP, ALT 636 FOR OP/ED): Performed by: STUDENT IN AN ORGANIZED HEALTH CARE EDUCATION/TRAINING PROGRAM

## 2024-06-09 PROCEDURE — 2500000001 HC RX 250 WO HCPCS SELF ADMINISTERED DRUGS (ALT 637 FOR MEDICARE OP): Performed by: STUDENT IN AN ORGANIZED HEALTH CARE EDUCATION/TRAINING PROGRAM

## 2024-06-09 RX ORDER — ONDANSETRON 4 MG/1
4 TABLET, FILM COATED ORAL EVERY 6 HOURS PRN
Status: DISCONTINUED | OUTPATIENT
Start: 2024-06-09 | End: 2024-06-11 | Stop reason: HOSPADM

## 2024-06-09 RX ORDER — ADHESIVE BANDAGE
10 BANDAGE TOPICAL
Status: DISCONTINUED | OUTPATIENT
Start: 2024-06-09 | End: 2024-06-11 | Stop reason: HOSPADM

## 2024-06-09 RX ORDER — ACETAMINOPHEN 325 MG/1
975 TABLET ORAL EVERY 6 HOURS
Status: DISCONTINUED | OUTPATIENT
Start: 2024-06-09 | End: 2024-06-11 | Stop reason: HOSPADM

## 2024-06-09 RX ORDER — CLINDAMYCIN PHOSPHATE 900 MG/50ML
900 INJECTION, SOLUTION INTRAVENOUS ONCE
Status: COMPLETED | OUTPATIENT
Start: 2024-06-09 | End: 2024-06-09

## 2024-06-09 RX ORDER — TRANEXAMIC ACID 100 MG/ML
1000 INJECTION, SOLUTION INTRAVENOUS ONCE AS NEEDED
Status: DISCONTINUED | OUTPATIENT
Start: 2024-06-09 | End: 2024-06-11 | Stop reason: HOSPADM

## 2024-06-09 RX ORDER — OXYTOCIN 10 [USP'U]/ML
10 INJECTION, SOLUTION INTRAMUSCULAR; INTRAVENOUS ONCE AS NEEDED
Status: DISCONTINUED | OUTPATIENT
Start: 2024-06-09 | End: 2024-06-11 | Stop reason: HOSPADM

## 2024-06-09 RX ORDER — BISACODYL 10 MG/1
10 SUPPOSITORY RECTAL DAILY PRN
Status: DISCONTINUED | OUTPATIENT
Start: 2024-06-09 | End: 2024-06-11 | Stop reason: HOSPADM

## 2024-06-09 RX ORDER — MISOPROSTOL 200 UG/1
800 TABLET ORAL ONCE AS NEEDED
Status: DISCONTINUED | OUTPATIENT
Start: 2024-06-09 | End: 2024-06-11 | Stop reason: HOSPADM

## 2024-06-09 RX ORDER — IBUPROFEN 600 MG/1
600 TABLET ORAL EVERY 6 HOURS
Status: DISCONTINUED | OUTPATIENT
Start: 2024-06-09 | End: 2024-06-11 | Stop reason: HOSPADM

## 2024-06-09 RX ORDER — LABETALOL HYDROCHLORIDE 5 MG/ML
20 INJECTION, SOLUTION INTRAVENOUS ONCE AS NEEDED
Status: DISCONTINUED | OUTPATIENT
Start: 2024-06-09 | End: 2024-06-11 | Stop reason: HOSPADM

## 2024-06-09 RX ORDER — CARBOPROST TROMETHAMINE 250 UG/ML
250 INJECTION, SOLUTION INTRAMUSCULAR ONCE AS NEEDED
Status: DISCONTINUED | OUTPATIENT
Start: 2024-06-09 | End: 2024-06-11 | Stop reason: HOSPADM

## 2024-06-09 RX ORDER — OXYTOCIN 10 [USP'U]/ML
10 INJECTION, SOLUTION INTRAMUSCULAR; INTRAVENOUS ONCE AS NEEDED
Status: DISCONTINUED | OUTPATIENT
Start: 2024-06-09 | End: 2024-06-09

## 2024-06-09 RX ORDER — HYDRALAZINE HYDROCHLORIDE 20 MG/ML
5 INJECTION INTRAMUSCULAR; INTRAVENOUS ONCE AS NEEDED
Status: DISCONTINUED | OUTPATIENT
Start: 2024-06-09 | End: 2024-06-11 | Stop reason: HOSPADM

## 2024-06-09 RX ORDER — ENOXAPARIN SODIUM 100 MG/ML
40 INJECTION SUBCUTANEOUS EVERY 24 HOURS
Status: DISCONTINUED | OUTPATIENT
Start: 2024-06-09 | End: 2024-06-11 | Stop reason: HOSPADM

## 2024-06-09 RX ORDER — METHYLERGONOVINE MALEATE 0.2 MG/ML
0.2 INJECTION INTRAVENOUS ONCE AS NEEDED
Status: DISCONTINUED | OUTPATIENT
Start: 2024-06-09 | End: 2024-06-11 | Stop reason: HOSPADM

## 2024-06-09 RX ORDER — OXYTOCIN/0.9 % SODIUM CHLORIDE 30/500 ML
60 PLASTIC BAG, INJECTION (ML) INTRAVENOUS ONCE AS NEEDED
Status: DISCONTINUED | OUTPATIENT
Start: 2024-06-09 | End: 2024-06-09

## 2024-06-09 RX ORDER — ONDANSETRON HYDROCHLORIDE 2 MG/ML
4 INJECTION, SOLUTION INTRAVENOUS EVERY 6 HOURS PRN
Status: DISCONTINUED | OUTPATIENT
Start: 2024-06-09 | End: 2024-06-11 | Stop reason: HOSPADM

## 2024-06-09 RX ORDER — LIDOCAINE 560 MG/1
1 PATCH PERCUTANEOUS; TOPICAL; TRANSDERMAL
Status: DISCONTINUED | OUTPATIENT
Start: 2024-06-09 | End: 2024-06-11 | Stop reason: HOSPADM

## 2024-06-09 RX ORDER — DIPHENHYDRAMINE HCL 25 MG
25 CAPSULE ORAL EVERY 6 HOURS PRN
Status: DISCONTINUED | OUTPATIENT
Start: 2024-06-09 | End: 2024-06-11 | Stop reason: HOSPADM

## 2024-06-09 RX ORDER — DIPHENHYDRAMINE HYDROCHLORIDE 50 MG/ML
25 INJECTION INTRAMUSCULAR; INTRAVENOUS ONCE
Status: COMPLETED | OUTPATIENT
Start: 2024-06-09 | End: 2024-06-09

## 2024-06-09 RX ORDER — DIPHENHYDRAMINE HYDROCHLORIDE 50 MG/ML
25 INJECTION INTRAMUSCULAR; INTRAVENOUS EVERY 6 HOURS PRN
Status: DISCONTINUED | OUTPATIENT
Start: 2024-06-09 | End: 2024-06-11 | Stop reason: HOSPADM

## 2024-06-09 RX ORDER — LOPERAMIDE HYDROCHLORIDE 2 MG/1
4 CAPSULE ORAL EVERY 2 HOUR PRN
Status: DISCONTINUED | OUTPATIENT
Start: 2024-06-09 | End: 2024-06-11 | Stop reason: HOSPADM

## 2024-06-09 RX ORDER — POLYETHYLENE GLYCOL 3350 17 G/17G
17 POWDER, FOR SOLUTION ORAL 2 TIMES DAILY PRN
Status: DISCONTINUED | OUTPATIENT
Start: 2024-06-09 | End: 2024-06-11 | Stop reason: HOSPADM

## 2024-06-09 RX ORDER — NIFEDIPINE 10 MG/1
10 CAPSULE ORAL ONCE AS NEEDED
Status: DISCONTINUED | OUTPATIENT
Start: 2024-06-09 | End: 2024-06-11 | Stop reason: HOSPADM

## 2024-06-09 RX ORDER — SIMETHICONE 80 MG
80 TABLET,CHEWABLE ORAL 4 TIMES DAILY PRN
Status: DISCONTINUED | OUTPATIENT
Start: 2024-06-09 | End: 2024-06-11 | Stop reason: HOSPADM

## 2024-06-09 RX ADMIN — ENOXAPARIN SODIUM 40 MG: 100 INJECTION SUBCUTANEOUS at 18:20

## 2024-06-09 RX ADMIN — IBUPROFEN 600 MG: 600 TABLET ORAL at 18:20

## 2024-06-09 RX ADMIN — ACETAMINOPHEN 975 MG: 325 TABLET ORAL at 12:24

## 2024-06-09 RX ADMIN — ACETAMINOPHEN 975 MG: 325 TABLET ORAL at 18:20

## 2024-06-09 RX ADMIN — IBUPROFEN 600 MG: 600 TABLET ORAL at 06:18

## 2024-06-09 RX ADMIN — GENTAMICIN SULFATE 400 MG: 40 INJECTION, SOLUTION INTRAMUSCULAR; INTRAVENOUS at 07:16

## 2024-06-09 RX ADMIN — MISOPROSTOL 800 MCG: 200 TABLET ORAL at 05:05

## 2024-06-09 RX ADMIN — CLINDAMYCIN PHOSPHATE 900 MG: 900 INJECTION, SOLUTION INTRAVENOUS at 06:29

## 2024-06-09 RX ADMIN — ACETAMINOPHEN 975 MG: 325 TABLET ORAL at 06:17

## 2024-06-09 RX ADMIN — IBUPROFEN 600 MG: 600 TABLET ORAL at 12:24

## 2024-06-09 RX ADMIN — DIPHENHYDRAMINE HYDROCHLORIDE 25 MG: 50 INJECTION, SOLUTION INTRAMUSCULAR; INTRAVENOUS at 00:53

## 2024-06-09 RX ADMIN — PENICILLIN G 3 MILLION UNITS: 3000000 INJECTION, SOLUTION INTRAVENOUS at 00:30

## 2024-06-09 ASSESSMENT — PAIN SCALES - GENERAL
PAINLEVEL_OUTOF10: 0 - NO PAIN
PAINLEVEL_OUTOF10: 6
PAINLEVEL_OUTOF10: 5 - MODERATE PAIN
PAINLEVEL_OUTOF10: 0 - NO PAIN
PAINLEVEL_OUTOF10: 0 - NO PAIN

## 2024-06-09 ASSESSMENT — PAIN DESCRIPTION - DESCRIPTORS
DESCRIPTORS: CRAMPING;DISCOMFORT;SORE
DESCRIPTORS: CRAMPING;DISCOMFORT;SORE

## 2024-06-09 NOTE — SIGNIFICANT EVENT
Late note entry 2/2 patient care.     Cervical Exam  Dilation: 4  Effacement (%): 70  Fetal Station: -3  Method: Manual  OB Examiner: Jose CAVANAUGH  Fetal Assessment  Movement: Present  Mode: External US  Baseline Fetal Heart Rate (bpm): 115 bpm  Baseline Classification: Normal  Variability: Moderate (Between 6 and 25 BPM)  Pattern: Accelerations  Pattern Observations: RN at bedside adjusting FHR US. FHR intermittently audible in 140s and patient reporting good movement  FHR Category: Category I      Contraction Frequency: 1.5-5    Will continue to monitor for cervical change as clinically indicated.    Malissa Garcia MD

## 2024-06-09 NOTE — ANESTHESIA PROCEDURE NOTES
Epidural Block    Patient location during procedure: OB  Start time: 6/8/2024 9:10 PM  End time: 6/8/2024 12:31 PM  Reason for block: labor analgesia  Staffing  Performed: resident and attending   Authorized by: Gilma Son MD    Performed by: Gilma Son MD    Preanesthetic Checklist  Completed: patient identified, IV checked, risks and benefits discussed, surgical consent, monitors and equipment checked, pre-op evaluation, timeout performed and sterile techniques followed  Block Timeout  RN/Licensed healthcare professional reads aloud to the Anesthesia provider and entire team: Patient identity, procedure with side and site, patient position, and as applicable the availability of implants/special equipment/special requirements.  Patient on coagulant treatment: no  Timeout performed at: 6/8/2024 9:13 PM  Block Placement  Patient position: sitting  Prep: ChloraPrep  Sterility prep: drape, cap, gloves and mask  Sedation level: no sedation  Patient monitoring: continuous pulse oximetry and blood pressure  Approach: midline  Local numbing: lidocaine 1% to skin and subcutaneous tissues  Vertebral space: lumbar  Lumbar location: L4-L5  Epidural  Loss of resistance technique: saline  Guidance: landmark technique        Needle  Needle type: Tuohy   Needle gauge: 17  Needle length: 8.9cm  Needle insertion depth: 6 cm  Catheter type: end hole  Catheter size: 19 G  Catheter at skin depth: 11 cm  Catheter securement method: liquid medical adhesive and clear occlusive dressing    Test dose: lidocaine 1.5% with epinephrine 1-to-200,000  Test dose: lidocaine 1.5% with epinephrine 1-to-200,000  Test dose result: no positive test dose    PCEA  Medication concentration used: 0.044% Bupivacaine with 1.25 mcg/mL Fentanyl and 1:[id number] Epinephrine  Dose (mL): 10  Lockout (minutes): 15  1-Hour Limit (boluses/hr): 4  Basal Rate: 12        Assessment  Sensory level: T10  Block outcome: patient comfortable  Number of attempts:  1  Events: no positive test dose  Procedure assessment: patient tolerated procedure well with no immediate complications

## 2024-06-09 NOTE — LACTATION NOTE
Lactation Consultant Note  Lactation Consultation  Reason for Consult: Initial assessment, Late  infant, Multiple gestation  Consultant Name: NANDO LomeliCLC    Maternal Information  Has mother  before?: Yes  How long did the mother previously breastfeed?: 3-year-old for 3 months  Previous Maternal Breastfeeding Challenges: None  Infant to breast within first 2 hours of birth?: Yes  Exclusive Pump and Bottle Feed: No    Maternal Assessment  Breast Assessment: Medium, Symmetrical, Soft, Compressible  Nipple Assessment: Intact, Erect  Areola Assessment: Normal    Infant Assessment  Infant Behavior: Quiet alert, Readiness to feed, Feeding cues observed, Rooting response    Feeding Assessment  Nutrition Source: Breastmilk  Feeding Method: Nursing at the breast  Feeding Position: Cross - cradle, Skin to skin, One side per feeding, Mother needs assistance with latch/positioning  Latch Assessment: No latch achieved    LATCH TOOL       Breast Pump  Pump: Hospital grade electric pump, Double breast pumping  Frequency: 8-10 times per day  Duration: 15-20 minutes per session  Breast Shield Size and Type: 24 mm    Other OB Lactation Tools       Patient Follow-up  Inpatient Lactation Follow-up Needed : Yes    Other OB Lactation Documentation  Maternal Risk Factors:  delivery <37 weeks  Infant Risk Factors: Prematurity <37 weeks    Recommendations/Summary    Here to assist this mother of twins who were born at 35 weeks gestation.She had previously been set up to pump by her nurse and was single-pumping when I came into the room and was getting some colostrum which was not yet reaching the bottle. She was offered encouragement. I discussed the recommendation to double pump and the mother verbalized understanding. She had visitors in the room during the pumping and breastfeeding assistance and was trying to maintain modesty by covering. The mother elected for the visitors to stay. She has experience  nursing her 3-year-old for about 3 months.The twins were 8 hours old at the time of the visit. The mother chose to put infant 2, who was demonstrating feeding cues,  to the breast. The infant was placed skin to skin with her mother. We used a right cross-cradle hold for latching. I reviewed correct infant body alignment, proper head/breast support, positioning the infant's nose opposite the maternal nipple, and bringing the infant to the breast with a wide, open mouth. The infant took the breast, but did not suck, despite stimulation. She soon fell asleep. We reviewed early feeding patterns and behaviors of the late  infant; early milk production; the benefits of skin to skin for breastfeeding; frequent feeds with goal of 8-12 feeds/24 hours; the importance of a deep latch; and alternating starting breast and allowing the infant to end the feeding. The mother was given written information on outpatient lactation services. She does not have a breast pump for home use. A personal use breast pump was ordered through her insurance company from [employer]. All questions were answered and the mother is encouraged to call for assistance as needed.

## 2024-06-09 NOTE — L&D DELIVERY NOTE
OB Delivery Note  2024  Elizabeth Davis  28 y.o.      Dipak Davis [[id number]]   Vaginal, Spontaneous      Brie Davis [[id number]]   Vaginal, Spontaneous        Gestational Age: 35w1d  /Para:   Quantitative Blood Loss: Admission to Discharge: 600 mL (2024  3:05 PM - 2024  5:22 AM)    Dipak Davis [[id number]]      Labor Events    Rupture date/time: 2024  Rupture type: Artificial  Fluid color: Clear  Fluid odor: None  Labor type: Induced Onset of Labor  Labor allowed to proceed with plans for an attempted vaginal birth?: Yes  Induction: Castro/EASI, Oxytocin, AROM  First cervical ripening date/time: 2024  Induction date/time: 2024  Induction indications: Risk Reducing  Complications: None       Labor Event Times    Labor onset date/time: 2024 1605  Dilation complete date/time: 2024  Start pushing date/time: 2024       Placenta    Placenta delivery date/time: 2024  Placenta removal: Spontaneous  Placenta appearance: Intact  Placenta disposition: family       Cord    Vessels: 3 vessels  Complications: None  Delayed cord clamping?: Yes  Cord clamped date/time: 2024       Lacerations    Episiotomy: None  Perineal laceration: None  Other lacerations?: No       Anesthesia    Method: Epidural       Operative Delivery    Forceps attempted?: No  Vacuum extractor attempted?: No       Shoulder Dystocia    Shoulder dystocia present?: No       Marion Delivery    Birth date/time: 2024 04:27:00  Delivery type: Vaginal, Spontaneous  Complications: None       Resuscitation    Method: Tactile stimulation       Apgars    Living status: Living  Apgar Component Scores:  1 min.:  5 min.:  10 min.:  15 min.:  20 min.:    Skin color:  0  1       Heart rate:  2  2       Reflex irritability:  2  2       Muscle tone:  2  2       Respiratory effort:  2  2       Total:  8  9       Apgars assigned by: JULIA GOMEZ       Delivery  Providers    Delivering clinician: Ivan Kiran MD   Provider Role    Mimi Diaz, RN Delivery Nurse    Ana Perez, JUNIE Nursery Nurse    Malissa Garcia MD Resident    Chela JEFFREY MD On Call Attending Physician               Brie Davis [[id number]]      Labor Events    Rupture date/time: 2024 2200  Rupture type: Artificial  Fluid color: Clear  Fluid odor: None  Labor type: Induced Onset of Labor  Labor allowed to proceed with plans for an attempted vaginal birth?: Yes  Induction: AROM, Castro/EASI  Induction indications: Risk Reducing  Complications: None       Placenta    Placenta delivery date/time:   Placenta removal: Spontaneous  Placenta appearance: Intact  Placenta disposition: pathology       Lacerations    Episiotomy: None  Perineal laceration: None  Other lacerations?: No       Anesthesia    Method: Epidural       Operative Delivery    Forceps attempted?: No  Vacuum extractor attempted?: No       Shoulder Dystocia    Shoulder dystocia present?: No        Delivery    Birth date/time: 2024 04:49:00  Delivery type: Vaginal, Spontaneous  Complications: None       Resuscitation    Method: Suctioning, Tactile stimulation       Apgars    Living status: Living  Apgar Component Scores:  1 min.:  5 min.:  10 min.:  15 min.:  20 min.:    Skin color:  0  1       Heart rate:  1  2       Reflex irritability:  2  2       Muscle tone:  2  2       Respiratory effort:  2  2       Total:  7  9       Apgars assigned by: OSEI CAVANAUGH       Delivery Providers    Delivering clinician: Ivan Kiran MD   Provider Role     Delivery Nurse     Nursery Nurse    Malissa Garcia MD Resident    Chela JEFFREY MD On Call Attending Physician               Patient transferred to OR for delivery.   Baby A delivered without complication. Immediately following delivery, BSUS & digital exam confirmed Baby B cephalic. AROM for clear & fundal pressure with good decent of fetal head. FSE placed. Baby B in  direct OP positioning, delivered without complication.      Malisas Garcia MD

## 2024-06-09 NOTE — SIGNIFICANT EVENT
House staff to patient bedside for FSE felling off and inability to trace with external monitor.    Subjective:  Patient resting in bed    Objective:  Cervical Exam  Dilation: 10  Effacement (%): 100  Fetal Station: 0  Method: Manual  OB Examiner: MD Chetna  Fetal Assessment  Movement: Present  Mode: Fetal scalp electrode  Baseline Fetal Heart Rate (bpm): 140 bpm  Baseline Classification: Normal  Variability: Moderate (Between 6 and 25 BPM)  Pattern: Accelerations  Pattern Observations: RN at bedside adjusting FHR US. FHR intermittently audible in 140s and patient reporting good movement  FHR Category: Category I      Contraction Frequency: 2-3    Assessment&Plan:  - Titrate pitocin per protocol, currently turned off for inability to trace  - Epidural infusing with good pain control  - FSE placed   - CEFM; Cat 1 currently    Isabella Basilio MD PGY1

## 2024-06-09 NOTE — DISCHARGE INSTRUCTIONS

## 2024-06-09 NOTE — SIGNIFICANT EVENT
Cervical Exam  Dilation: 3  Effacement (%): 70  Fetal Station: -3  Method: Manual  OB Examiner: Jose CAVANAUGH  Fetal Assessment  Movement: Present  Mode: External US  Baseline Fetal Heart Rate (bpm): 145 bpm (baseline change from 125-145bpm)  Baseline Classification: Normal  Variability: Moderate (Between 6 and 25 BPM)  Pattern: Accelerations  FHR Category: Category I      Contraction Frequency: 4-5      AROM'd clear, FSE placed on twin A. Patient now comfortable with epidural. Pitocin to start at this time.     Malissa Garcia MD

## 2024-06-09 NOTE — CARE PLAN
The patient's goals for the shift include   Problem: Postpartum  Goal: Experiences normal postpartum course  Outcome: Progressing     Problem: Safety - Adult  Goal: Free from fall injury  Outcome: Progressing           VSS, bleeding and swelling minimal, pain controlled with medications, IV flushed, voids, breastfeeding/formula feeding/pumping.

## 2024-06-09 NOTE — ANESTHESIA PREPROCEDURE EVALUATION
Patient: Elizabeth Davis    Evaluation Method: In-person visit    Procedure Information    Date: 06/08/24  Procedure: Labor Consult         Relevant Problems   Anesthesia (within normal limits)      Pulmonary (within normal limits)      Neuro   (+) Anxiety   (+) Depression affecting pregnancy in second trimester, antepartum ([facility])      GI   (+) Gastroesophageal reflux disease (GERD with pregnancy)      /Renal (within normal limits)      Liver (within normal limits)      Endocrine (within normal limits)      Hematology   (+) Anemia during pregnancy in third trimester (HHS-HCC)      HEENT   (+) Conductive hearing loss      GYN   (+) Monochorionic diamniotic twin gestation in third trimester ([facility])       Clinical information reviewed:   Tobacco  Allergies  Meds   Med Hx  Surg Hx   Fam Hx  Soc Hx        NPO Detail:  No data recorded     OB/GYN     PHYSICAL EXAM    Anesthesia PlanPatient: Elizabeth POE Ryan    Evaluation Method: In-person visit    Procedure Information    Date: 06/08/24  Procedure: Labor Analgesia         Relevant Problems   Neuro   (+) Anxiety   (+) Depression affecting pregnancy in second trimester, antepartum (HHS-HCC)      GI   (+) Gastroesophageal reflux disease      Hematology   (+) Anemia during pregnancy in third trimester ([facility])      HEENT   (+) Conductive hearing loss      GYN   (+) Monochorionic diamniotic twin gestation in third trimester ([facility])       Clinical information reviewed:   Tobacco  Allergies  Meds   Med Hx  Surg Hx   Fam Hx  Soc Hx        NPO Detail:  No data recorded     OB/Gyn Evaluation    Present Pregnancy    Patient is pregnant now.   Obstetric History                Physical Exam    Airway  Mallampati: III  TM distance: >3 FB  Neck ROM: full     Cardiovascular    Dental - normal exam     Pulmonary    Abdominal            Anesthesia Plan    History of general anesthesia?: no  History of complications of general anesthesia?: yes    ASA 2     epidural      Anesthetic plan and risks discussed with patient.    Plan discussed with attending and resident.

## 2024-06-10 PROBLEM — O99.342 ANXIETY DURING PREGNANCY IN SECOND TRIMESTER, ANTEPARTUM (HHS-HCC): Status: RESOLVED | Noted: 2024-04-04 | Resolved: 2024-06-10

## 2024-06-10 PROBLEM — D50.9 IRON DEFICIENCY ANEMIA: Status: ACTIVE | Noted: 2022-06-14

## 2024-06-10 PROBLEM — O09.32: Status: RESOLVED | Noted: 2024-04-04 | Resolved: 2024-06-10

## 2024-06-10 PROBLEM — O99.342 DEPRESSION AFFECTING PREGNANCY IN SECOND TRIMESTER, ANTEPARTUM (HHS-HCC): Status: RESOLVED | Noted: 2024-04-04 | Resolved: 2024-06-10

## 2024-06-10 PROBLEM — F32.A DEPRESSION AFFECTING PREGNANCY IN SECOND TRIMESTER, ANTEPARTUM (HHS-HCC): Status: RESOLVED | Noted: 2024-04-04 | Resolved: 2024-06-10

## 2024-06-10 PROBLEM — O30.033 MONOCHORIONIC DIAMNIOTIC TWIN GESTATION IN THIRD TRIMESTER (HHS-HCC): Status: RESOLVED | Noted: 2024-04-04 | Resolved: 2024-06-10

## 2024-06-10 PROBLEM — O40.3XX2: Status: RESOLVED | Noted: 2024-04-05 | Resolved: 2024-06-10

## 2024-06-10 PROBLEM — F41.9 ANXIETY DURING PREGNANCY IN SECOND TRIMESTER, ANTEPARTUM (HHS-HCC): Status: RESOLVED | Noted: 2024-04-04 | Resolved: 2024-06-10

## 2024-06-10 PROCEDURE — 1210000001 HC SEMI-PRIVATE ROOM DAILY

## 2024-06-10 PROCEDURE — 2500000001 HC RX 250 WO HCPCS SELF ADMINISTERED DRUGS (ALT 637 FOR MEDICARE OP): Performed by: STUDENT IN AN ORGANIZED HEALTH CARE EDUCATION/TRAINING PROGRAM

## 2024-06-10 PROCEDURE — 2500000004 HC RX 250 GENERAL PHARMACY W/ HCPCS (ALT 636 FOR OP/ED): Performed by: STUDENT IN AN ORGANIZED HEALTH CARE EDUCATION/TRAINING PROGRAM

## 2024-06-10 RX ADMIN — IBUPROFEN 600 MG: 600 TABLET ORAL at 00:22

## 2024-06-10 RX ADMIN — ENOXAPARIN SODIUM 40 MG: 100 INJECTION SUBCUTANEOUS at 18:51

## 2024-06-10 RX ADMIN — ACETAMINOPHEN 975 MG: 325 TABLET ORAL at 00:23

## 2024-06-10 RX ADMIN — ACETAMINOPHEN 975 MG: 325 TABLET ORAL at 12:08

## 2024-06-10 RX ADMIN — POLYETHYLENE GLYCOL 3350 17 G: 17 POWDER, FOR SOLUTION ORAL at 09:29

## 2024-06-10 RX ADMIN — IBUPROFEN 600 MG: 600 TABLET ORAL at 12:08

## 2024-06-10 ASSESSMENT — PAIN SCALES - GENERAL
PAINLEVEL_OUTOF10: 0 - NO PAIN
PAINLEVEL_OUTOF10: 6
PAINLEVEL_OUTOF10: 1
PAINLEVEL_OUTOF10: 6
PAINLEVEL_OUTOF10: 0 - NO PAIN
PAINLEVEL_OUTOF10: 0 - NO PAIN

## 2024-06-10 ASSESSMENT — PAIN DESCRIPTION - DESCRIPTORS: DESCRIPTORS: CRAMPING;DISCOMFORT;SORE

## 2024-06-10 ASSESSMENT — PAIN DESCRIPTION - LOCATION
LOCATION: BACK
LOCATION: BACK

## 2024-06-10 NOTE — CARE PLAN
The patient's goals for the shift include   Problem: Postpartum  Goal: Experiences normal postpartum course  Outcome: Progressing     Problem: Safety - Adult  Goal: Free from fall injury  Outcome: Progressing           VSS, bleeding and swelling minimal, pain controlled with medications, IV out, breastfeeding/pumping/formula feeding.

## 2024-06-10 NOTE — ANESTHESIA POSTPROCEDURE EVALUATION
Patient: Elizabeth Davis    Procedure Summary       Date: 06/08/24 Room / Location:     Anesthesia Start: 2110 Anesthesia Stop: 06/09/24 0449    Procedure: Labor Analgesia Diagnosis:     Scheduled Providers:  Responsible Provider: Marco Cotton DO    Anesthesia Type: epidural ASA Status: 2            Anesthesia Type: epidural    Vitals Value Taken Time   /71 06/10/24 0856   Temp 36.2 °C (97.2 °F) 06/10/24 0856   Pulse 67 06/10/24 0856   Resp 18 06/10/24 0856   SpO2 96 % 06/10/24 0856       Anesthesia Post Evaluation    Patient location during evaluation: floor  Patient participation: complete - patient participated  Level of consciousness: awake and alert  Pain management: adequate  Airway patency: patent  Cardiovascular status: acceptable  Respiratory status: acceptable  Hydration status: acceptable  Postoperative Nausea and Vomiting: none    No notable events documented.

## 2024-06-10 NOTE — LACTATION NOTE
Lactation Consultant Note  Lactation Consultation  Reason for Consult: Follow-up assessment, Late  infant, Multiple gestation  Consultant Name: Brianne Marin RN, IBCLC    Maternal Information       Maternal Assessment  Breast Assessment: Medium, Symmetrical, Soft, Compressible  Nipple Assessment: Intact, Erect  Areola Assessment: Normal    Infant Assessment  Infant Behavior: Readiness to feed, Suckles on and off, needs stimulation, Feeding cues observed, Content after feeding    Feeding Assessment  Nutrition Source: Breastmilk  Feeding Method: Nursing at the breast  Feeding Position: Cross - cradle, Cradle, C - hold, Skin to skin, One side per feeding, Nipple to nose, Mother demonstrates good positioning  Suck/Feeding: Sustained, Coordinated suck/swallow/breathe, Tactile stimulation needed, Audible swallowing with stimulaton  Latch Assessment: Minimal assistance is needed, Instructed on deep latch, Areolar attachment, Deep latch obtained, Optimal angle of mouth opening, Comfortable with no pain, Chin and lower lip contact breast first, Flanged lips, Comfortable latch    LATCH TOOL  Latch: Grasps breast, tongue down, lips flanged, rhythmic sucking  Audible Swallowing: A few with stimulation  Type of Nipple: Everted (After stimulation)  Comfort (Breast/Nipple): Soft/non-tender  Hold (Positioning): Minimal assist, teach one side, mother does other, staff holds  LATCH Score: 8    Breast Pump  Pump: Hospital grade electric pump, Double breast pumping  Frequency: Less than 3 times per day (has only pumped once yesterday)  Duration: Initiate phase  Breast Shield Size and Type: 21 mm  Units of Volume: mL per session    Other OB Lactation Tools       Patient Follow-up  Outpatient Lactation Follow-up: Recommended    Other OB Lactation Documentation  Maternal Risk Factors:  delivery <37 weeks  Infant Risk Factors: Prematurity <37 weeks    Recommendations/Summary  In the room to observe/ assist with  "latching.   Mom stated she has been able to independently latch the 35 week TWINS to the breast and denies any pain with the latch.     Mom breast fed her first child for 3 months.     Mom was able to independently latch both babies to the breast (in cross cradle hold/ cradle hold) with minimal assistance from RN, IBCLC. I showed her how to elicit a wider gape of the mouth with latching Baby \"A\" to the left breast using the cross cradle hold and then once latch was established to transition to cradle hold.   Mom was able to independently latch the baby \"B' to the breast on the right breast in cradle hold.   Both latches were comfortable and noted swallows from both TWINS.   The TWINS did need stimulation to keep feeding but, they responded well and noted many swallows.     Mom stated she has only pumped one time yesterday because she has been feeding the twins at the breast.   I reviewed why it is important for stimulation of the breasts d/t their gestational age. Although her babies did breast feed well at this time, I reviewed how every feeding can be different and if she has a feeding that one or both does not feed like this feeding, she should try to pump for 20 minutes and then feed the pumped breast milk to the TWINS.   I reviewed several different options on navigating breast feeding twins and encouraged her to see how they continue to feed and adjust as needed.     Encouraged mom to breastfeed on demand with a goal of 8-12 feeds in a 24 hour period.   If both baby is not showing feeding cues and it has been 3 hours since the last time the baby was fed or the last time the babies attempted to feed, encouraged her to place babies  in skin to skin and attempt to feed.   Encouraged her to pump after the feeds (if able) for 20 minutes on both breasts and give the TWINS the pumped breast milk.     Mom has a breast pump for at home.    Denies any questions at this time.     Encouraged her to utilize the outpatient " lactation resources, if needed.   Contact information given.   [phone] [facility] or [phone] Mya

## 2024-06-10 NOTE — CARE PLAN
Problem: Postpartum  Goal: Experiences normal postpartum course  Outcome: Progressing     Problem: Safety - Adult  Goal: Free from fall injury  Outcome: Progressing   The patient's goals for the shift include rest, adequate pain control    The clinical goals for the shift include maintain stable VS, bleeding and pain WDL  Goals achieved, pt VS were stable, plain and bleeding also minimal.

## 2024-06-10 NOTE — PROGRESS NOTES
Postpartum Progress Note      Assessment/Plan     Elizabeth Davis is a 28 y.o.,  initially presented for IOL ISO mo/di twins.  She had a  x2 on 2024 at 35w1d and is now postpartum day 1.    #Isolated maternal fever  - T: 38.1 (24 @ 0657)  - s/p 1x dose gent & clinda  - afebrile for 24hr, no tachycardia, VSS  - no further abx required @ this time    #Fe def anemia  - on PO Fe  - Admit Hg 9.5  - asx  - Continue at DC    #Postpartum  - continue routine postpartum care  - pain well controlled on po medications  - DVT Score: 5 ; SCDs and enoxaparin prophylactic dose daily while inpatient  - The patient's blood type is A POS.  Rhogam is not indicated.    #Maternal Well-Being  - emotional support provided  - bonding with infants @ bedside  - Contraception: POPs We discussed the need to take the pill at exactly the same time everyday and if >2hrs late a backup method must be used for 1 week. Pt verbalized understanding.     # Feeding  - breastfeeding/pumping encouraged; lactation consult prn    #Dispo  - anticipate d/c on PPD #2 if continuing to meet all postpartum milestones  - for f/u 4-6 weeks with Primary OB provider    Principal Problem:     (normal spontaneous vaginal delivery) ([facility])  Active Problems:    Anemia during pregnancy in third trimester ([facility])    Anxiety    Subjective   Pt seen at bedside with 2 year old son, twin baby girls, and mother. Endorses cramps with pumping . Tolerable with PO meds and heat packs.   denies dizziness/lightheadedness/LOC/uncontrolled bleeding.   Meeting all postpartum milestones- ambulating independently, passing flatus, tolerating PO intake, lochia light, voiding spontaneously, and pain well controlled with PO meds.    Objective   Physical Exam:  General: well appearing, well nourished, postpartum  Obstetric: fundus firm below umbilicus, lochia light  Skin: Warm, dry; no rashes/lesions/erythema  Breast: No masses, nipple discharge  Neuro: A/Ox3, no  gross motor deficit   GI: no distension, appropriately tender, soft, +BS  Respiratory: Even and unlabored on RA, LSCTA BL  Cardiovascular: No BLE edema; No erythema, warmth  Psych: appropriate mood and affect, conversational    Last Vitals:  Temp Pulse Resp BP MAP Pulse Ox   36.2 °C (97.2 °F) 67 18 126/71   96 %       Vitals Min/Max Last 24 Hours:  Temp  Min: 36.2 °C (97.2 °F)  Max: 37.3 °C (99.1 °F)  Pulse  Min: 60  Max: 69  Resp  Min: 16  Max: 18  BP  Min: 100/58  Max: 126/71    Lab Data:  Lab Results   Component Value Date    WBC 7.8 06/08/2024    HGB 9.5 (L) 06/08/2024    HCT 31.6 (L) 06/08/2024     06/08/2024

## 2024-06-10 NOTE — PROGRESS NOTES
Social Work Assessment      Patient: Elizabeth Davis  [address]  Phone: [phone]     Referral Reason: history of anxiety and depression; resources     Prenatal Care: started PNC at [facility] on 24 around 25 weeks gestation.   Barriers: Relies on family to drive her to appointments     Union Name: Mayte Terry and Tarah Terry  Union : 24     Other Children: 2 year old boy named Abraham     FOB: Demetrius Terry II    Household Composition: Twin baby girls will go home to reside with MOB Ms. Elizabeth Davis, FOB Demetrius Terry II, and 2 year old sibling. MOB and FOSUE are engaged.    Supports: TRISTA reports that she has strong support from her family. She resides in a two family home with her mother and step-father living upstairs and her father and step-mother living down the street. Her mother will be staying with her for a couple of months while her fiance is deployed with the [employer].     IPV/DV or Safety Concerns: Denies     Safe Sleep Education: SW reviewed principles and importance of safe sleep which MOB acknowledged understanding of. Baby Girls will sleep in their own cribs.     Transportation Concerns: TRISTA's mother drives her to appointments.     School/Work/Income: TRISTA works from home as a [employer] . LISA is in the [employer]. TRISTA will apply for WIC if she qualifies. NAHOMY provided WIC info.      Insurance: [employer]     Substance Use History: Denies     Mental Health Diagnoses/Concerns:  SW reviewed postpartum depression signs, symptoms, and patient indicated understanding. SW provided PMAD resources which TRISTA was open to. TRISTA shared that she has a history of anxiety and depression during the pregnancy but denies current symptoms. She denies HI or SI. TRISTA has been attending weekly therapy remotely and plans to continue doing so. She appears open to reaching out for additional support if needed.     Bonding: NO bonding concerns noted. TRISTA was  observed holding one twin securely in her arms and then placing her securely in the bassinet before picking up the other twin girl to feed her. TRISTA speaks lovingly of her children and appears to be bonding appropriately.     Department of Children and Family Services (DCFS): Denies history.     Assessment:   This  met at beside with TRISTA as she was caring for her twin girls. She was friendly and open to speaking with the . Ms. Davis shared that her fiance is being deployed for 6 months which has been emotionally difficult at times, however she has strong support from her parents who all live nearby. This is the second child for TRISTA who also has a 2 year old son at home.     TRISTA reports having all necessary baby items at home. She has an infant car seat and a convertible car seat. She also ordered a second infant car seat from [employer] which is on the way but may not arrive in time for discharge. TRISTA shared that she has a nursery set up with cribs for the baby girls. She is aware of safe sleep recommendations.     TRISTA denies a history of drug use or alcohol abuse. She has a history of depression and anxiety but denies current symptoms but acknowledged feeling sad about FOB's deployment. She denies HI or SI. She attends weekly therapy which she plans to continue.     NAHOMY provided info for [facility] guidelines and [facility] locations and encouraged TRISTA to make an appt. SW will refer the twins for Help me [facility] at TRISTA's request. NAHOMY also provided [employer] info. TRISTA denies needing other resources at this time.    Plan:   No other psychosocial needs identified at this time. SW will remain available as needed. MOB and twin baby girls are clear for discharge from a social work perspective when medically cleared.    Signature: SABRINA Andrew

## 2024-06-11 ENCOUNTER — PHARMACY VISIT (OUTPATIENT)
Dept: PHARMACY | Facility: CLINIC | Age: 29
End: 2024-06-11
Payer: MEDICAID

## 2024-06-11 VITALS
SYSTOLIC BLOOD PRESSURE: 121 MMHG | HEART RATE: 56 BPM | DIASTOLIC BLOOD PRESSURE: 77 MMHG | HEIGHT: 70 IN | BODY MASS INDEX: 26.07 KG/M2 | WEIGHT: 182.1 LBS | RESPIRATION RATE: 16 BRPM | TEMPERATURE: 96.8 F | OXYGEN SATURATION: 96 %

## 2024-06-11 LAB
BLOOD EXPIRATION DATE: NORMAL
DISPENSE STATUS: NORMAL
PRODUCT BLOOD TYPE: 6200
PRODUCT CODE: NORMAL
UNIT ABO: NORMAL
UNIT NUMBER: NORMAL
UNIT RH: NORMAL
UNIT VOLUME: 350
XM INTEP: NORMAL

## 2024-06-11 PROCEDURE — RXMED WILLOW AMBULATORY MEDICATION CHARGE

## 2024-06-11 PROCEDURE — 2500000001 HC RX 250 WO HCPCS SELF ADMINISTERED DRUGS (ALT 637 FOR MEDICARE OP): Performed by: STUDENT IN AN ORGANIZED HEALTH CARE EDUCATION/TRAINING PROGRAM

## 2024-06-11 RX ORDER — IBUPROFEN 600 MG/1
600 TABLET ORAL EVERY 6 HOURS
Qty: 30 TABLET | Refills: 0 | Status: SHIPPED | OUTPATIENT
Start: 2024-06-11

## 2024-06-11 RX ORDER — ACETAMINOPHEN 325 MG/1
975 TABLET ORAL EVERY 6 HOURS
Qty: 90 TABLET | Refills: 0 | Status: SHIPPED | OUTPATIENT
Start: 2024-06-11

## 2024-06-11 RX ADMIN — IBUPROFEN 600 MG: 600 TABLET ORAL at 15:15

## 2024-06-11 RX ADMIN — ACETAMINOPHEN 975 MG: 325 TABLET ORAL at 01:02

## 2024-06-11 RX ADMIN — IBUPROFEN 600 MG: 600 TABLET ORAL at 01:02

## 2024-06-11 RX ADMIN — ACETAMINOPHEN 975 MG: 325 TABLET ORAL at 06:56

## 2024-06-11 RX ADMIN — ACETAMINOPHEN 975 MG: 325 TABLET ORAL at 15:15

## 2024-06-11 RX ADMIN — IBUPROFEN 600 MG: 600 TABLET ORAL at 06:56

## 2024-06-11 ASSESSMENT — PAIN SCALES - GENERAL
PAINLEVEL_OUTOF10: 1
PAINLEVEL_OUTOF10: 1
PAINLEVEL_OUTOF10: 0 - NO PAIN
PAINLEVEL_OUTOF10: 1

## 2024-06-11 ASSESSMENT — PAIN DESCRIPTION - DESCRIPTORS: DESCRIPTORS: CRAMPING;DISCOMFORT

## 2024-06-11 NOTE — CARE PLAN
The patient's goals for the shift include Stable vital signs throughout shift    The clinical goals for the shift include healthy mom and healthy baby    Problem: Postpartum  Goal: Experiences normal postpartum course  Outcome: Adequate for Discharge     Problem: Safety - Adult  Goal: Free from fall injury  Outcome: Adequate for Discharge

## 2024-06-11 NOTE — DISCHARGE SUMMARY
Discharge Summary    Admission Date: 2024  Discharge Date: 24      Discharge Diagnosis   (normal spontaneous vaginal delivery) ([facility])    Hospital Course  Delivery Date:      Dipak Davis [[id number]]   2024      Brie Davis [[id number]]   2024       Dipak Davis [[id number]]   4:27 AM      Brie Davis [[id number]]   4:49 AM   Delivery type:      Dipak Davis [[id number]]   Vaginal, Spontaneous      Brie Davis [[id number]]   Vaginal, Spontaneous    GA at delivery: 35w1d  Outcome:      Dipak Davis [[id number]]   Living      Brie Davis [[id number]]   Living   Anesthesia during delivery:      Dipak Davis [[id number]]   Epidural      Brie Davis [[id number]]   Epidural   Intrapartum complications:      Dipak Davis [[id number]]   None      Brie Davis [[id number]]   None   Feeding method: Breastfeeding Status: Yes     Procedures: none  Contraception at discharge: none  Feels well.  Unsure if twins being discharged    Discharge Meds     Your medication list        START taking these medications        Instructions Last Dose Given Next Dose Due   acetaminophen 325 mg tablet  Commonly known as: Tylenol      Take 3 tablets (975 mg) by mouth every 6 hours.       ibuprofen 600 mg tablet      Take 1 tablet (600 mg) by mouth every 6 hours.              CONTINUE taking these medications        Instructions Last Dose Given Next Dose Due   ferrous sulfate (325 mg ferrous sulfate) tablet                  STOP taking these medications      Prenatal Vitamin Plus Low Iron 27 mg iron- 1 mg tablet  Generic drug: prenatal vitamin calcium-iron-folic                  Where to Get Your Medications        These medications were sent to [facility]  [address] [place] [id number]      Hours: 8:30 AM to 5 PM Mon-Fri Phone: [phone]   acetaminophen 325 mg tablet  ibuprofen 600 mg tablet          Complications  Requiring Follow-Up  Heavy vaginal bleeding, passing clots, fever and/or chills      Test Results Pending At Discharge  Pending Labs       Order Current Status    Surgical Pathology Exam - PLACENTA In process            Outpatient Follow-Up  No future appointments.    I spent 3 minutes in the professional and overall care of this patient.      Jenny Cruz, APRN-CNP

## 2024-06-11 NOTE — CARE PLAN
The patient's goals for the shift include Stable vital signs throughout shift    The clinical goals for the shift include Stable vital signs throughout shift    Over the shift, the patient made   Problem: Postpartum  Goal: Experiences normal postpartum course  Outcome: Progressing   progress toward the following goals

## 2024-06-11 NOTE — LACTATION NOTE
Lactation Consultant Note  Lactation Consultation       Maternal Information       Maternal Assessment       Infant Assessment       Feeding Assessment       LATCH TOOL       Breast Pump       Other OB Lactation Tools       Patient Follow-up       Other OB Lactation Documentation       Recommendations/Summary  Attempted to see patient regarding breast feeding the TWINS, however, she was asleep. Will check back

## 2024-06-11 NOTE — LACTATION NOTE
Lactation Consultant Note  Lactation Consultation       Maternal Information       Maternal Assessment       Infant Assessment       Feeding Assessment       LATCH TOOL       Breast Pump       Other OB Lactation Tools       Patient Follow-up       Other OB Lactation Documentation       Recommendations/Summary  I did not view a latch (on either twin) at this time.   Mom stated she continues to be able to latch the babies to the breast independently and denies any pain with the latch.   She did feed formula supplement at times but, is pleased with how breast feeding is progressing.   She denies any questions or concerns at this time.   She anticipates discharge to home today.     Plan;  Encouraged mom to breastfeed on demand with a goal of 8-12 feeds in a 24 hour period.   If the twins do not show feeding cues and it has been 3 hours since the last time they fed or the last time they attempted to feed, encouraged her to place each baby  in skin to skin and attempt to feed.   If they do not latch or she chooses to not latch the babies to the breast; encouraged her to pump for 20 minutes on both breasts and feed the pumped breast milk obtained prior to any formula supplement.   Again reviewed variations in feeding plan d/t twins and encouraged mom to continue to adjust as she needs.     She has a breast pump for at home.     Encouraged her to utilize the outpatient lactation resources, if needed.   Contact information given.   [phone] [facility] or [phone] Mya

## 2024-06-18 LAB
LABORATORY COMMENT REPORT: NORMAL
PATH REPORT.COMMENTS IMP SPEC: NORMAL
PATH REPORT.FINAL DX SPEC: NORMAL
PATH REPORT.GROSS SPEC: NORMAL
PATH REPORT.RELEVANT HX SPEC: NORMAL
PATH REPORT.TOTAL CANCER: NORMAL
RESIDENT REVIEW: NORMAL

## 2024-08-01 ENCOUNTER — APPOINTMENT (OUTPATIENT)
Dept: OBSTETRICS AND GYNECOLOGY | Facility: CLINIC | Age: 29
End: 2024-08-01
Payer: MEDICAID

## 2024-09-19 ENCOUNTER — APPOINTMENT (OUTPATIENT)
Dept: OBSTETRICS AND GYNECOLOGY | Facility: CLINIC | Age: 29
End: 2024-09-19
Payer: MEDICAID

## 2025-06-11 ENCOUNTER — OFFICE VISIT (OUTPATIENT)
Dept: OBSTETRICS AND GYNECOLOGY | Facility: HOSPITAL | Age: 30
End: 2025-06-11
Payer: MEDICAID

## 2025-06-11 VITALS
WEIGHT: 144 LBS | DIASTOLIC BLOOD PRESSURE: 64 MMHG | SYSTOLIC BLOOD PRESSURE: 99 MMHG | HEIGHT: 70 IN | BODY MASS INDEX: 20.62 KG/M2 | HEART RATE: 92 BPM

## 2025-06-11 DIAGNOSIS — Z00.00 HEALTHCARE MAINTENANCE: Primary | ICD-10-CM

## 2025-06-11 PROCEDURE — 99212 OFFICE O/P EST SF 10 MIN: CPT | Performed by: NURSE PRACTITIONER

## 2025-06-11 PROCEDURE — 1036F TOBACCO NON-USER: CPT | Performed by: NURSE PRACTITIONER

## 2025-06-11 PROCEDURE — 3008F BODY MASS INDEX DOCD: CPT | Performed by: NURSE PRACTITIONER

## 2025-06-11 SDOH — ECONOMIC STABILITY: TRANSPORTATION INSECURITY
IN THE PAST 12 MONTHS, HAS THE LACK OF TRANSPORTATION KEPT YOU FROM MEDICAL APPOINTMENTS OR FROM GETTING MEDICATIONS?: NO

## 2025-06-11 SDOH — ECONOMIC STABILITY: TRANSPORTATION INSECURITY
IN THE PAST 12 MONTHS, HAS LACK OF TRANSPORTATION KEPT YOU FROM MEETINGS, WORK, OR FROM GETTING THINGS NEEDED FOR DAILY LIVING?: NO

## 2025-06-11 ASSESSMENT — PATIENT HEALTH QUESTIONNAIRE - PHQ9
SUM OF ALL RESPONSES TO PHQ9 QUESTIONS 1 & 2: 0
2. FEELING DOWN, DEPRESSED OR HOPELESS: NOT AT ALL
1. LITTLE INTEREST OR PLEASURE IN DOING THINGS: NOT AT ALL

## 2025-06-11 ASSESSMENT — SOCIAL DETERMINANTS OF HEALTH (SDOH)
HOW HARD IS IT FOR YOU TO PAY FOR THE VERY BASICS LIKE FOOD, HOUSING, MEDICAL CARE, AND HEATING?: HARD
IN THE PAST 12 MONTHS, HAS THE ELECTRIC, GAS, OIL, OR WATER COMPANY THREATENED TO SHUT OFF SERVICE IN YOUR HOME?: ALREADY SHUT OFF

## 2025-06-11 ASSESSMENT — PAIN SCALES - GENERAL: PAINLEVEL_OUTOF10: 4

## 2025-06-11 NOTE — PROGRESS NOTES
Here today as a follow-up from the emergency room at the [facility] yesterday.  She went in with a complaint of shortness of breath and chest discomfort.  She was evaluated but states she was never seen by eye doctor.  She was told to follow-up with her primary provider and got scheduled in the OB/GYN department.    Explained to patient primary care versus gynecology.    Referral placed for patient to get established with GYN provider.  Chart review shows patient has had canceled or missed appointments in the GYN department.  She is also been seen at the [facility] and has a pulmonologist appointment in August scheduled.  With further discussion she reports she is on a cancellation list.